# Patient Record
Sex: FEMALE | Race: WHITE | Employment: STUDENT | URBAN - METROPOLITAN AREA
[De-identification: names, ages, dates, MRNs, and addresses within clinical notes are randomized per-mention and may not be internally consistent; named-entity substitution may affect disease eponyms.]

---
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
## 2018-01-18 ENCOUNTER — OFFICE VISIT (OUTPATIENT)
Dept: OBGYN CLINIC | Facility: CLINIC | Age: 18
End: 2018-01-18

## 2018-01-18 VITALS
BODY MASS INDEX: 22.26 KG/M2 | HEART RATE: 97 BPM | HEIGHT: 64.5 IN | DIASTOLIC BLOOD PRESSURE: 78 MMHG | SYSTOLIC BLOOD PRESSURE: 121 MMHG | WEIGHT: 132 LBS

## 2018-01-18 DIAGNOSIS — Z01.419 ENCOUNTER FOR GYNECOLOGICAL EXAMINATION WITHOUT ABNORMAL FINDING: Primary | ICD-10-CM

## 2018-01-18 DIAGNOSIS — Z30.41 ENCOUNTER FOR BIRTH CONTROL PILLS MAINTENANCE: ICD-10-CM

## 2018-01-18 PROCEDURE — 99395 PREV VISIT EST AGE 18-39: CPT | Performed by: OBSTETRICS & GYNECOLOGY

## 2018-01-18 RX ORDER — ACETAMINOPHEN AND CODEINE PHOSPHATE 120; 12 MG/5ML; MG/5ML
0.35 SOLUTION ORAL DAILY
Qty: 3 PACKAGE | Refills: 4 | Status: SHIPPED | OUTPATIENT
Start: 2018-01-18 | End: 2019-07-08

## 2018-01-18 NOTE — PROGRESS NOTES
Well Woman Exam    HPI:  The patient is a 15yo female who presented for annual exam. She has no complaints. Got back from DR for New Years. She has lost 35 pounds and is very happy. 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  Getting menses about every 3 months wi history. MEDICATIONS:    Current Outpatient Prescriptions:   •  Norethindrone (ORTHO MICRONOR) 0.35 MG Oral Tab, Take 1 tablet (0.35 mg total) by mouth daily. , Disp: 3 Package, Rfl: 4  •  Acyclovir 5 % External Cream, Apply 1 Application topically every nipple retraction or skin changes  Abdomen:  soft, nontender, nondistended, no masses  Skin/Hair: no unusual rashes or bruises  Extremities: no edema, no cyanosis  Psychiatric: Appropriate mood and affect    Pelvic Exam:  External Genitalia: normal appeara

## 2018-01-19 ENCOUNTER — TELEPHONE (OUTPATIENT)
Dept: ALLERGY | Facility: CLINIC | Age: 18
End: 2018-01-19

## 2018-01-19 NOTE — TELEPHONE ENCOUNTER
Pt calling regarding medication Levocetirizine Dihydrochloride 5 MG Oral Tab. Pt state that her insurance does not cover this medication and would like to know if there is something similar that would be covered with her insurance. Please advise.   Pt was

## 2018-03-15 ENCOUNTER — OFFICE VISIT (OUTPATIENT)
Dept: SURGERY | Facility: CLINIC | Age: 18
End: 2018-03-15

## 2018-03-15 VITALS
OXYGEN SATURATION: 100 % | BODY MASS INDEX: 21.92 KG/M2 | WEIGHT: 131.56 LBS | SYSTOLIC BLOOD PRESSURE: 155 MMHG | HEIGHT: 65 IN | RESPIRATION RATE: 16 BRPM | HEART RATE: 119 BPM | DIASTOLIC BLOOD PRESSURE: 92 MMHG

## 2018-03-15 DIAGNOSIS — R63.2 BINGE EATING: Primary | ICD-10-CM

## 2018-03-15 DIAGNOSIS — Z51.81 ENCOUNTER FOR THERAPEUTIC DRUG MONITORING: ICD-10-CM

## 2018-03-15 DIAGNOSIS — R53.82 CHRONIC FATIGUE: ICD-10-CM

## 2018-03-15 DIAGNOSIS — E55.9 VITAMIN D DEFICIENCY: ICD-10-CM

## 2018-03-15 PROCEDURE — 99214 OFFICE O/P EST MOD 30 MIN: CPT | Performed by: INTERNAL MEDICINE

## 2018-05-07 DIAGNOSIS — B00.1 COLD SORE: Primary | ICD-10-CM

## 2018-05-07 RX ORDER — ACYCLOVIR 400 MG/1
400 TABLET ORAL
Qty: 32 TABLET | Refills: 0 | Status: SHIPPED | OUTPATIENT
Start: 2018-05-07 | End: 2018-05-14

## 2018-06-13 ENCOUNTER — OFFICE VISIT (OUTPATIENT)
Dept: SURGERY | Facility: CLINIC | Age: 18
End: 2018-06-13

## 2018-06-13 VITALS
BODY MASS INDEX: 22.06 KG/M2 | HEIGHT: 65 IN | WEIGHT: 132.44 LBS | HEART RATE: 120 BPM | DIASTOLIC BLOOD PRESSURE: 86 MMHG | SYSTOLIC BLOOD PRESSURE: 123 MMHG

## 2018-06-13 DIAGNOSIS — E55.9 VITAMIN D DEFICIENCY: ICD-10-CM

## 2018-06-13 DIAGNOSIS — Z51.81 ENCOUNTER FOR THERAPEUTIC DRUG MONITORING: ICD-10-CM

## 2018-06-13 DIAGNOSIS — R53.82 CHRONIC FATIGUE: Primary | ICD-10-CM

## 2018-06-13 DIAGNOSIS — R63.2 BINGE EATING: ICD-10-CM

## 2018-06-13 PROCEDURE — 99214 OFFICE O/P EST MOD 30 MIN: CPT | Performed by: INTERNAL MEDICINE

## 2018-06-13 NOTE — PROGRESS NOTES
[address], [facility]  [address] [facility] 91 [facility] [address]  Dept: [phone]     Date:   4/3/2017    Patient:  Al Jimenez  :      3/19/2000  MRN:      [id number]    Chief Complaint: (VYVANSE) 30 MG Oral Cap Take 1 capsule (30 mg total) by mouth every morning.  Disp: 30 capsule Rfl: 0   CLINDAMYCIN PHOSPHATE 1 % External Lotion APPLY 2 TIMES EVERY DAY A THIN FILM TO THE AFFECTED AREA(S) Disp: 60 mL Rfl: 5   clotrimazole-betamethasone (L meals in advance, Read nutrition labels, Drink 64 oz of water per day, Maintain a daily food journal, No drinking 30 minutes before or after meals, Utlize portion control strategies to reduce calorie intake, Identify triggers for eating and manage cues and per day. Feels much better with vyvanse  Tolerating well    Started on mini pill by GYN    Blood work done  SilverPush OTC b12    No orders of the defined types were placed in this encounter.         Kranthi Moreno MD

## 2018-07-12 ENCOUNTER — OFFICE VISIT (OUTPATIENT)
Dept: FAMILY MEDICINE CLINIC | Facility: CLINIC | Age: 18
End: 2018-07-12

## 2018-07-12 VITALS
TEMPERATURE: 99 F | SYSTOLIC BLOOD PRESSURE: 118 MMHG | HEIGHT: 65 IN | DIASTOLIC BLOOD PRESSURE: 80 MMHG | BODY MASS INDEX: 22.16 KG/M2 | HEART RATE: 90 BPM | OXYGEN SATURATION: 98 % | WEIGHT: 133 LBS

## 2018-07-12 DIAGNOSIS — J06.9 VIRAL URI: Primary | ICD-10-CM

## 2018-07-12 PROCEDURE — 99213 OFFICE O/P EST LOW 20 MIN: CPT | Performed by: NURSE PRACTITIONER

## 2018-07-12 RX ORDER — ACYCLOVIR 400 MG/1
TABLET ORAL AS NEEDED
COMMUNITY
Start: 2018-05-07 | End: 2020-03-10

## 2018-07-12 NOTE — PATIENT INSTRUCTIONS
· Hydrate! (cold or hot based on comfort). Drink lots of water or other non dehydrating liquids to help with illness. Salty foods, soups and tea can help with throat pain.    · Hand washing-use hand  or wash hands frequently, cover your cough Rinses help keep your sinuses and nose moist. Mix a teaspoon of salt in 8 ounces of fresh, warm water. Use a bulb syringe to gently squirt the water into your nose a few times a day. You can also buy ready-made saline nasal sprays.   Apply hot or cold packs · If symptoms are severe, rest at home for the first 2 to 3 days. When you resume activity, don't let yourself get too tired. · Avoid being exposed to cigarette smoke (yours or others’).   · You may use acetaminophen or ibuprofen to control pain and fever, © 7476-3042 [employer] [address]. [facility] [facility] [place]. All rights reserved. This information is not intended as a substitute for professional medical care. Always follow your healthcare professional's instructions.

## 2018-07-12 NOTE — PROGRESS NOTES
CHIEF COMPLAINT:   Patient presents with:  Sinus Problem      HPI:   Eward Ahumada is a 19year old female who presents for uri symptoms for  5 days.  Patient reports sore throat only at the beginning of sx's, congestion, ears poppiing, off balance wit Smoking status: Never Smoker                                                              Smokeless tobacco: Never Used                      Comment: No household smokers.    Alcohol use: Yes           0.0 oz/week     Comment: social        REVIEW OF SYSTEM The patient indicates understanding of these issues and agrees to the plan. OTC decongestants, throat lozenges and Tylenol     Patient Instructions           · Hydrate! (cold or hot based on comfort).  Drink lots of water or other non dehydrating liquids Rinses help keep your sinuses and nose moist. Mix a teaspoon of salt in 8 ounces of fresh, warm water. Use a bulb syringe to gently squirt the water into your nose a few times a day. You can also buy ready-made saline nasal sprays.   Apply hot or cold packs · If symptoms are severe, rest at home for the first 2 to 3 days. When you resume activity, don't let yourself get too tired. · Avoid being exposed to cigarette smoke (yours or others’).   · You may use acetaminophen or ibuprofen to control pain and fever, © 4126-2239 [employer]. 1407 [facility], [facility] [place]. All rights reserved. This information is not intended as a substitute for professional medical care. Always follow your healthcare professional's instructions.

## 2018-07-20 NOTE — TELEPHONE ENCOUNTER
Done.Lisset.
MRI order has been placed. Called patient. LMTCB. When patient calls back will provide number for central scheduling.
Order not yet placed. Let patient know AM out of office today but message has been routed and will call patient once order placed.
PRL levels are only mildy elevated now. Were around 39, now 25. Normal is 24.2    We can do two things: We can get a MRI tyo look for a pituitary tumor OR we can wait a month and repeat levels. Please let me know what she should like to do.     Atfer this
Pt called back and stated that she would like to have MRI completed.  Please call thank you
Pt called regarding MRI.  Please call thank you
Pt retd call. Pt aware MRI order entered. Number given for Central Scheduling.
See below patient would like order for MRI of pituitary.
Spoke with [employer]. Informed her of Dr. Graeme Salazar message below. She would like to discuss the options with her parents and will call back. Will await patient call.
Skin normal color for race, warm, dry and intact. No evidence of rash.

## 2018-08-29 ENCOUNTER — OFFICE VISIT (OUTPATIENT)
Dept: SURGERY | Facility: CLINIC | Age: 18
End: 2018-08-29
Payer: COMMERCIAL

## 2018-08-29 VITALS
HEART RATE: 103 BPM | HEIGHT: 65 IN | BODY MASS INDEX: 21.67 KG/M2 | OXYGEN SATURATION: 100 % | WEIGHT: 130.06 LBS | RESPIRATION RATE: 18 BRPM | DIASTOLIC BLOOD PRESSURE: 80 MMHG | SYSTOLIC BLOOD PRESSURE: 129 MMHG

## 2018-08-29 DIAGNOSIS — E55.9 VITAMIN D DEFICIENCY: ICD-10-CM

## 2018-08-29 DIAGNOSIS — Z51.81 ENCOUNTER FOR THERAPEUTIC DRUG MONITORING: ICD-10-CM

## 2018-08-29 DIAGNOSIS — R63.2 BINGE EATING: Primary | ICD-10-CM

## 2018-08-29 PROCEDURE — 99214 OFFICE O/P EST MOD 30 MIN: CPT | Performed by: INTERNAL MEDICINE

## 2018-08-29 NOTE — PROGRESS NOTES
[address], [address] [facility] [address]  Dept: [phone]     Date:   4/3/2017    Patient:  Queenie Marshall  :      3/19/2000  MRN:      [id number]    Chief Complaint: Social History:      Social History  Social History   Marital status: Single  Spouse name: N/A    Years of education: N/A  Number of children: N/A     Occupational History  None on file     Social History Main Topics   Smoking status: Never Smoker    S Minutes    ROS:    Constitutional: negative  Respiratory: negative  Cardiovascular: negative  Gastrointestinal: negative  Musculoskeletal:negative  Neurological: negative  Behavioral/Psych: negative  All other systems were reviewed and are negative    Phys

## 2018-09-10 NOTE — TELEPHONE ENCOUNTER
LOV: 10-14-16. Pt requesting refill on tretinoin 0.05% External Cream. Advised pt she will need to schedule an f/u appt in order to receive a refill pt went ahead and schedule an f/u appt for 10-19-18. Please advise.

## 2018-10-15 ENCOUNTER — TELEPHONE (OUTPATIENT)
Dept: OBGYN CLINIC | Facility: CLINIC | Age: 18
End: 2018-10-15

## 2018-10-15 NOTE — TELEPHONE ENCOUNTER
Informed pt that [place] [facility] [address] stated if she missed pills, she can have BTB. Continue with ocp, use condoms this month, and monitor. Informed pt to monitor and to call us if she continues with btb. Pt stated understanding.

## 2018-10-15 NOTE — TELEPHONE ENCOUNTER
Pt is calling, because she got a period 9/24/18 and it lasted 7 days and then another period 10/8 and it still continues. She states with the period of  10/8 it comes and goes, it is intermittent. When she is bleeding she changes a pad twice a day.   She h

## 2018-11-07 ENCOUNTER — OFFICE VISIT (OUTPATIENT)
Dept: SURGERY | Facility: CLINIC | Age: 18
End: 2018-11-07
Payer: COMMERCIAL

## 2018-11-07 VITALS
HEART RATE: 80 BPM | DIASTOLIC BLOOD PRESSURE: 66 MMHG | SYSTOLIC BLOOD PRESSURE: 124 MMHG | WEIGHT: 130 LBS | HEIGHT: 65 IN | RESPIRATION RATE: 20 BRPM | OXYGEN SATURATION: 99 % | BODY MASS INDEX: 21.66 KG/M2

## 2018-11-07 DIAGNOSIS — R53.82 CHRONIC FATIGUE: Primary | ICD-10-CM

## 2018-11-07 DIAGNOSIS — R63.2 BINGE EATING: ICD-10-CM

## 2018-11-07 DIAGNOSIS — E53.8 LOW SERUM VITAMIN B12: ICD-10-CM

## 2018-11-07 DIAGNOSIS — E66.3 OVERWEIGHT (BMI 25.0-29.9): ICD-10-CM

## 2018-11-07 DIAGNOSIS — E55.9 VITAMIN D DEFICIENCY: ICD-10-CM

## 2018-11-07 DIAGNOSIS — Z51.81 ENCOUNTER FOR THERAPEUTIC DRUG MONITORING: ICD-10-CM

## 2018-11-07 PROCEDURE — 99214 OFFICE O/P EST MOD 30 MIN: CPT | Performed by: INTERNAL MEDICINE

## 2018-11-07 NOTE — PROGRESS NOTES
[address], [address] [facility] [address]  Dept: [phone]     Date:   4/3/2017    Patient:  Abel Quiroga  :      3/19/2000  MRN:      [id number]    Chief Complaint: Social History:    Social History    Socioeconomic History      Marital status: Single      Spouse name: Not on file      Number of children: Not on file      Years of education: Not on file      Highest education level: Not on file    Social Needs of snacks per day: 1 Type of snacks:  fruit  · Amount of soda consumption per day:  0  · Amount of water (in ounces) per day:  64  · Drinking between meals only:  yes  · Toughest challenge:      Nutritional Goals  Limit carbohydrates to 100 gms per day, Ea Panel (14)          Standing Status: Future          Standing Expiration Date: 11/7/2019      Vitamin B12          Standing Status: Future          Standing Expiration Date: 11/7/2019      Vitamin D, 25-Hydroxy          Standing Status: Future          Sta

## 2018-12-07 ENCOUNTER — LAB ENCOUNTER (OUTPATIENT)
Dept: LAB | Facility: HOSPITAL | Age: 18
End: 2018-12-07
Attending: INTERNAL MEDICINE
Payer: COMMERCIAL

## 2018-12-07 DIAGNOSIS — Z51.81 ENCOUNTER FOR THERAPEUTIC DRUG MONITORING: ICD-10-CM

## 2018-12-07 DIAGNOSIS — E53.8 LOW SERUM VITAMIN B12: ICD-10-CM

## 2018-12-07 DIAGNOSIS — R63.2 BINGE EATING: ICD-10-CM

## 2018-12-07 DIAGNOSIS — E55.9 VITAMIN D DEFICIENCY: ICD-10-CM

## 2018-12-07 DIAGNOSIS — E66.3 OVERWEIGHT (BMI 25.0-29.9): ICD-10-CM

## 2018-12-07 DIAGNOSIS — R53.82 CHRONIC FATIGUE: ICD-10-CM

## 2018-12-07 PROCEDURE — 82607 VITAMIN B-12: CPT

## 2018-12-07 PROCEDURE — 84443 ASSAY THYROID STIM HORMONE: CPT

## 2018-12-07 PROCEDURE — 80053 COMPREHEN METABOLIC PANEL: CPT

## 2018-12-07 PROCEDURE — 82306 VITAMIN D 25 HYDROXY: CPT

## 2018-12-07 PROCEDURE — 80061 LIPID PANEL: CPT

## 2018-12-07 PROCEDURE — 85025 COMPLETE CBC W/AUTO DIFF WBC: CPT

## 2018-12-07 PROCEDURE — 36415 COLL VENOUS BLD VENIPUNCTURE: CPT

## 2018-12-28 ENCOUNTER — TELEPHONE (OUTPATIENT)
Dept: INTERNAL MEDICINE CLINIC | Facility: CLINIC | Age: 18
End: 2018-12-28

## 2018-12-28 NOTE — TELEPHONE ENCOUNTER
Was in [facility]. Came home, now has diarrhea. Do you want to do a fecal test? (mom a nurse and can bring home a collection cup). Also has cramps. Since last Thursday.   At least once or twice a day; especially during a meal has cramping and

## 2019-01-28 ENCOUNTER — OFFICE VISIT (OUTPATIENT)
Dept: SURGERY | Facility: CLINIC | Age: 19
End: 2019-01-28
Payer: COMMERCIAL

## 2019-01-28 VITALS
DIASTOLIC BLOOD PRESSURE: 87 MMHG | WEIGHT: 127.06 LBS | SYSTOLIC BLOOD PRESSURE: 142 MMHG | RESPIRATION RATE: 18 BRPM | BODY MASS INDEX: 21.17 KG/M2 | HEART RATE: 120 BPM | OXYGEN SATURATION: 100 % | HEIGHT: 65 IN

## 2019-01-28 DIAGNOSIS — R63.2 BINGE EATING: Primary | ICD-10-CM

## 2019-01-28 DIAGNOSIS — Z51.81 ENCOUNTER FOR THERAPEUTIC DRUG MONITORING: ICD-10-CM

## 2019-01-28 DIAGNOSIS — R63.2 INCREASED APPETITE: ICD-10-CM

## 2019-01-28 DIAGNOSIS — R53.82 CHRONIC FATIGUE: ICD-10-CM

## 2019-01-28 PROCEDURE — 99214 OFFICE O/P EST MOD 30 MIN: CPT | Performed by: INTERNAL MEDICINE

## 2019-01-28 NOTE — PROGRESS NOTES
[address], [facility]  [address] [facility] 91 [facility] [address]  Dept: [phone]     Date:   4/3/2017    Patient:  Tacos Genao  :      3/19/2000  MRN:      [id number]    Chief Complaint: Mold     Social History:    Social History    Socioeconomic History      Marital status: Single      Spouse name: Not on file      Number of children: Not on file      Years of education: Not on file      Highest education level: Not on file    Social Need 20  · # of snacks per day: 1 Type of snacks:  fruit  · Amount of soda consumption per day:  0  · Amount of water (in ounces) per day:  64  · Drinking between meals only:  yes  · Toughest challenge:      Nutritional Goals  Limit carbohydrates to 100 gms per bariatric surgery. Patient desires to pursue traditional weight loss at this time. Fatigue: improving with weight loss      Binge eating: improved with vyvanse  Increased stress with school    Goals for next month:  1. Keep a food log.   2. Drink 48-64

## 2019-02-14 ENCOUNTER — OFFICE VISIT (OUTPATIENT)
Dept: ALLERGY | Facility: CLINIC | Age: 19
End: 2019-02-14
Payer: COMMERCIAL

## 2019-02-14 VITALS
HEART RATE: 78 BPM | SYSTOLIC BLOOD PRESSURE: 112 MMHG | TEMPERATURE: 98 F | RESPIRATION RATE: 17 BRPM | OXYGEN SATURATION: 93 % | DIASTOLIC BLOOD PRESSURE: 60 MMHG

## 2019-02-14 DIAGNOSIS — J30.89 SEASONAL ALLERGIC RHINITIS DUE TO FUNGAL SPORES: Primary | ICD-10-CM

## 2019-02-14 DIAGNOSIS — J30.81 ALLERGIC RHINITIS DUE TO ANIMALS: ICD-10-CM

## 2019-02-14 PROCEDURE — 99212 OFFICE O/P EST SF 10 MIN: CPT | Performed by: ALLERGY & IMMUNOLOGY

## 2019-02-14 PROCEDURE — 99214 OFFICE O/P EST MOD 30 MIN: CPT | Performed by: ALLERGY & IMMUNOLOGY

## 2019-02-14 NOTE — PROGRESS NOTES
Josie Keene is a 19year old female. HPI:   Patient presents with: Allergies: Pt reports she needs to have a letterhead from Dr. Lashaun Delgado stating she needs a dorm with Air Conditioning due to her seasonal allergies.  Pt reports she has tried to live i Smoker      Smokeless tobacco: Never Used      Tobacco comment: No household smokers. Alcohol use:  Yes      Alcohol/week: 0.0 oz      Comment: social    Drug use: No       Medications (Active prior to today's visit):    Current Outpatient Medications: are moist   Neck/Thyroid: neck is supple without adenopathy  Lymphatic: no abnormal cervical, supraclavicular or axillary adenopathy is noted  Respiratory: normal to inspection lungs are clear to auscultation bilaterally normal respiratory effort   Cardiov

## 2019-02-14 NOTE — PATIENT INSTRUCTIONS
Reviewed prior skin testing in the past being positive to mold cats dogs and cockroaches over 2 years ago  Reviewed avoidance measures as well as potential treatment option of immunotherapy  Continue with Xyzal or Allegra as needed  Add Flonase or Rhinocor

## 2019-02-18 ENCOUNTER — OFFICE VISIT (OUTPATIENT)
Dept: INTERNAL MEDICINE CLINIC | Facility: CLINIC | Age: 19
End: 2019-02-18
Payer: COMMERCIAL

## 2019-02-18 VITALS
DIASTOLIC BLOOD PRESSURE: 70 MMHG | WEIGHT: 129 LBS | HEART RATE: 101 BPM | BODY MASS INDEX: 21.49 KG/M2 | OXYGEN SATURATION: 100 % | SYSTOLIC BLOOD PRESSURE: 104 MMHG | HEIGHT: 65 IN

## 2019-02-18 DIAGNOSIS — B36.0 TINEA VERSICOLOR: ICD-10-CM

## 2019-02-18 DIAGNOSIS — Z02.0 SCHOOL PHYSICAL EXAM: Primary | ICD-10-CM

## 2019-02-18 PROCEDURE — 99213 OFFICE O/P EST LOW 20 MIN: CPT | Performed by: INTERNAL MEDICINE

## 2019-02-18 RX ORDER — SELENIUM SULFIDE 2.5 MG/100ML
LOTION TOPICAL
Qty: 150 ML | Refills: 1 | Status: SHIPPED | OUTPATIENT
Start: 2019-02-18 | End: 2021-08-27

## 2019-02-19 PROCEDURE — 86580 TB INTRADERMAL TEST: CPT | Performed by: INTERNAL MEDICINE

## 2019-02-19 NOTE — PROGRESS NOTES
HPI:    Patient ID: Meghna Guerrero is a 19year old female. 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 Has some depigmented areas on lateral thighs.     Review of Systems   Constitutional: Negative for fatigue, and ear canal normal.   Nose: Nose normal.   Eyes: Pupils are equal, round, and reactive to light. No scleral icterus. Neck: Normal range of motion. Neck supple. Cardiovascular: Normal rate and regular rhythm.     Pulmonary/Chest: Effort normal and hanane

## 2019-02-20 ENCOUNTER — NURSE ONLY (OUTPATIENT)
Dept: INTERNAL MEDICINE CLINIC | Facility: CLINIC | Age: 19
End: 2019-02-20
Payer: COMMERCIAL

## 2019-02-20 LAB — INDURATION (): 0 MM (ref 0–11)

## 2019-03-07 ENCOUNTER — OFFICE VISIT (OUTPATIENT)
Dept: DERMATOLOGY CLINIC | Facility: CLINIC | Age: 19
End: 2019-03-07
Payer: COMMERCIAL

## 2019-03-07 DIAGNOSIS — L30.9 DERMATITIS: ICD-10-CM

## 2019-03-07 DIAGNOSIS — L70.0 ACNE VULGARIS: ICD-10-CM

## 2019-03-07 DIAGNOSIS — L73.9 FOLLICULITIS: Primary | ICD-10-CM

## 2019-03-07 PROCEDURE — 99212 OFFICE O/P EST SF 10 MIN: CPT | Performed by: DERMATOLOGY

## 2019-03-07 PROCEDURE — 99213 OFFICE O/P EST LOW 20 MIN: CPT | Performed by: DERMATOLOGY

## 2019-04-24 ENCOUNTER — OFFICE VISIT (OUTPATIENT)
Dept: SURGERY | Facility: CLINIC | Age: 19
End: 2019-04-24
Payer: COMMERCIAL

## 2019-04-24 VITALS
SYSTOLIC BLOOD PRESSURE: 118 MMHG | HEART RATE: 78 BPM | BODY MASS INDEX: 21.49 KG/M2 | OXYGEN SATURATION: 99 % | RESPIRATION RATE: 16 BRPM | HEIGHT: 65 IN | WEIGHT: 129 LBS | DIASTOLIC BLOOD PRESSURE: 78 MMHG

## 2019-04-24 DIAGNOSIS — R63.2 BINGE EATING: Primary | ICD-10-CM

## 2019-04-24 DIAGNOSIS — R63.2 INCREASED APPETITE: ICD-10-CM

## 2019-04-24 DIAGNOSIS — E55.9 VITAMIN D DEFICIENCY: ICD-10-CM

## 2019-04-24 DIAGNOSIS — Z51.81 ENCOUNTER FOR THERAPEUTIC DRUG MONITORING: ICD-10-CM

## 2019-04-24 PROCEDURE — 99214 OFFICE O/P EST MOD 30 MIN: CPT | Performed by: INTERNAL MEDICINE

## 2019-04-24 RX ORDER — LISDEXAMFETAMINE DIMESYLATE 30 MG/1
CAPSULE ORAL
COMMUNITY
Start: 2019-04-18 | End: 2020-02-10

## 2019-04-24 NOTE — PROGRESS NOTES
[address] 91 [facility] [address]  Dept: [phone]     Date:   4/3/2017    Patient:  Tamika Dye  :      3/19/2000  MRN:      [id number]    Chief Complaint: Topically as directed Disp: 150 mL Rfl: 1   Tretinoin 0.05 % External Cream Apply to acne  as directed at bedtime.  Disp: 20 g Rfl: 0   acyclovir 400 MG Oral Tab  Disp:  Rfl:    Norethindrone (ORTHO MICRONOR) 0.35 MG Oral Tab Take 1 tablet (0.35 mg total) b Not on file    Other Topics      Concerns:         Service: Not Asked        Blood Transfusions: Not Asked        Caffeine Concern: Yes          2 cups daily coffee or tea        Occupational Exposure: Not Asked        Hobby Hazards: Not Asked Treadmill for  45 Minutes    ROS:    Constitutional: negative  Respiratory: negative  Cardiovascular: negative  Gastrointestinal: negative  Musculoskeletal:negative  Neurological: negative  Behavioral/Psych: negative  All other systems were reviewed and ar

## 2019-04-29 ENCOUNTER — APPOINTMENT (OUTPATIENT)
Dept: LAB | Facility: HOSPITAL | Age: 19
End: 2019-04-29
Attending: INTERNAL MEDICINE
Payer: COMMERCIAL

## 2019-04-29 DIAGNOSIS — R63.2 BINGE EATING: ICD-10-CM

## 2019-04-29 DIAGNOSIS — R63.2 INCREASED APPETITE: ICD-10-CM

## 2019-04-29 DIAGNOSIS — Z51.81 ENCOUNTER FOR THERAPEUTIC DRUG MONITORING: ICD-10-CM

## 2019-04-29 PROCEDURE — 93010 ELECTROCARDIOGRAM REPORT: CPT | Performed by: INTERNAL MEDICINE

## 2019-04-29 PROCEDURE — 93005 ELECTROCARDIOGRAM TRACING: CPT

## 2019-06-18 ENCOUNTER — OFFICE VISIT (OUTPATIENT)
Dept: DERMATOLOGY CLINIC | Facility: CLINIC | Age: 19
End: 2019-06-18
Payer: COMMERCIAL

## 2019-06-18 DIAGNOSIS — L72.0 EPIDERMAL CYST: ICD-10-CM

## 2019-06-18 DIAGNOSIS — R23.1 LIVEDO RETICULARIS: Primary | ICD-10-CM

## 2019-06-18 PROCEDURE — 99212 OFFICE O/P EST SF 10 MIN: CPT | Performed by: DERMATOLOGY

## 2019-06-18 PROCEDURE — 99213 OFFICE O/P EST LOW 20 MIN: CPT | Performed by: DERMATOLOGY

## 2019-06-18 NOTE — PROGRESS NOTES
Past Medical History:   Diagnosis Date   • Acute bronchitis    • Allergic rhinitis    • Closed fracture of unspecified phalanx or phalanges of hand 2010   • Facial erythema 2001   • Other acne 2014   • Overweight (BMI 25.0-29. 9)    • Seasonal allergies Concerns:         Service: Not Asked        Blood Transfusions: Not Asked        Caffeine Concern: Yes          2 cups daily coffee or tea        Occupational Exposure: Not Asked        Hobby Hazards: Not Asked        Sleep Concern: Not Asked

## 2019-06-18 NOTE — PATIENT INSTRUCTIONS
The changes on your legs are most likely due to vasospasm/a physiologic form of livedo reticularis.   No specific treatment is recommended at this time, but if you do develop arthritis or muscle weakness or muscle aches or joint aches and pains or any issue

## 2019-06-18 NOTE — PROGRESS NOTES
HPI:     Chief Complaint     Derm Problem; Lesion        HPI     Derm Problem      Additional comments: LOV 3/7/2019 Patient present with discoloration on bilateral legs and abdomen .  Patient denies any family or personal hx of sc              Lesion MG Oral Cap Take 1 capsule (30 mg total) by mouth daily. Disp: 30 capsule Rfl: 0   triamcinolone acetonide 0.1 % External Cream Use bid as directed Disp: 80 g Rfl: 0   Azelaic Acid (FINACEA) 15 % External Foam Apply 1 Application topically daily.  Disp: 50 use: Yes        Alcohol/week: 0.0 oz        Comment: social      Drug use: No      Sexual activity: Yes        Birth control/protection: Condom    Lifestyle      Physical activity:        Days per week: Not on file        Minutes per session: Not on file midline. 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 In light of absence of symptoms, I do not think patient needs a connective tissue or other evaluation at this time.   Has had recent normal CBC and

## 2019-07-08 RX ORDER — NORETHINDRONE 0.35 MG/1
TABLET ORAL
Qty: 84 TABLET | Refills: 0 | OUTPATIENT
Start: 2019-07-08

## 2019-07-08 RX ORDER — ACETAMINOPHEN AND CODEINE PHOSPHATE 120; 12 MG/5ML; MG/5ML
0.35 SOLUTION ORAL DAILY
Qty: 1 PACKAGE | Refills: 0 | Status: SHIPPED | OUTPATIENT
Start: 2019-07-08 | End: 2019-08-05

## 2019-07-08 RX ORDER — ACETAMINOPHEN AND CODEINE PHOSPHATE 120; 12 MG/5ML; MG/5ML
0.35 SOLUTION ORAL DAILY
Qty: 3 PACKAGE | Refills: 4 | OUTPATIENT
Start: 2019-07-08

## 2019-07-08 NOTE — TELEPHONE ENCOUNTER
1 month OCP sent to Pharmacy per protocol. LM informing pt Rx sent to Pharmacy.    Last annual=1/18/18  Next annual= 7/30/19

## 2019-07-09 NOTE — TELEPHONE ENCOUNTER
Spoke with patient and informed her per Duc prescription will be at the  at the [place] location ready to be picked up. Patient verbalizes understanding.

## 2019-07-30 ENCOUNTER — OFFICE VISIT (OUTPATIENT)
Dept: SURGERY | Facility: CLINIC | Age: 19
End: 2019-07-30
Payer: COMMERCIAL

## 2019-07-30 ENCOUNTER — OFFICE VISIT (OUTPATIENT)
Dept: OBGYN CLINIC | Facility: CLINIC | Age: 19
End: 2019-07-30
Payer: COMMERCIAL

## 2019-07-30 VITALS
WEIGHT: 131.88 LBS | SYSTOLIC BLOOD PRESSURE: 123 MMHG | HEART RATE: 121 BPM | DIASTOLIC BLOOD PRESSURE: 83 MMHG | OXYGEN SATURATION: 100 % | HEIGHT: 65 IN | BODY MASS INDEX: 21.97 KG/M2

## 2019-07-30 VITALS
WEIGHT: 131 LBS | SYSTOLIC BLOOD PRESSURE: 122 MMHG | HEIGHT: 65 IN | HEART RATE: 103 BPM | DIASTOLIC BLOOD PRESSURE: 82 MMHG | BODY MASS INDEX: 21.83 KG/M2

## 2019-07-30 DIAGNOSIS — Z30.41 ENCOUNTER FOR BIRTH CONTROL PILLS MAINTENANCE: ICD-10-CM

## 2019-07-30 DIAGNOSIS — Z01.419 ENCOUNTER FOR GYNECOLOGICAL EXAMINATION WITHOUT ABNORMAL FINDING: Primary | ICD-10-CM

## 2019-07-30 DIAGNOSIS — Z51.81 ENCOUNTER FOR THERAPEUTIC DRUG MONITORING: Primary | ICD-10-CM

## 2019-07-30 DIAGNOSIS — Z12.4 CERVICAL CANCER SCREENING: ICD-10-CM

## 2019-07-30 DIAGNOSIS — R53.82 CHRONIC FATIGUE: ICD-10-CM

## 2019-07-30 DIAGNOSIS — R63.2 INCREASED APPETITE: ICD-10-CM

## 2019-07-30 DIAGNOSIS — R63.2 BINGE EATING: ICD-10-CM

## 2019-07-30 PROCEDURE — 99214 OFFICE O/P EST MOD 30 MIN: CPT | Performed by: INTERNAL MEDICINE

## 2019-07-30 PROCEDURE — 99395 PREV VISIT EST AGE 18-39: CPT | Performed by: OBSTETRICS & GYNECOLOGY

## 2019-07-30 RX ORDER — ACETAMINOPHEN AND CODEINE PHOSPHATE 120; 12 MG/5ML; MG/5ML
0.35 SOLUTION ORAL DAILY
Qty: 3 PACKAGE | Refills: 4 | Status: SHIPPED | OUTPATIENT
Start: 2019-07-30 | End: 2020-11-06

## 2019-07-30 NOTE — PROGRESS NOTES
[address] [facility] [address]  Dept: [phone]     Date:   4/3/2017    Patient:  Donovan Dubin  :      3/19/2000  MRN:      [id number]    Chief Complaint: Topically as directed Disp: 150 mL Rfl: 1   Levocetirizine Dihydrochloride 5 MG Oral Tab Take 1 tablet (5 mg total) by mouth nightly. Disp: 30 tablet Rfl: 3     Allergies:  Cats Claw, Uncaria Tomentosa; Dog Dander [Dander];  Mold     Social History:    Soci Not Asked        Sleep Concern: Not Asked        Stress Concern: Not Asked        Weight Concern: Not Asked        Special Diet: Not Asked        Back Care: Not Asked        Exercise: Yes          gym class        Bike Helmet: Not Asked        Seat Belt: N reviewed and are negative    Physical Exam:   General appearance: alert, appears stated age and cooperative  Head: Normocephalic, without obvious abnormality, atraumatic  Eyes: conjunctivae/corneas clear. PERRL, EOM's intact. Fundi benign.   Lungs: clear to

## 2019-09-13 ENCOUNTER — HOSPITAL ENCOUNTER (EMERGENCY)
Facility: HOSPITAL | Age: 19
Discharge: HOME | End: 2019-09-13
Attending: EMERGENCY MEDICINE
Payer: COMMERCIAL

## 2019-09-13 ENCOUNTER — ANESTHESIA EVENT (OUTPATIENT)
Dept: SURGERY | Facility: HOSPITAL | Age: 19
End: 2019-09-13
Payer: COMMERCIAL

## 2019-09-13 ENCOUNTER — ANESTHESIA (OUTPATIENT)
Dept: SURGERY | Facility: HOSPITAL | Age: 19
End: 2019-09-13
Payer: COMMERCIAL

## 2019-09-13 ENCOUNTER — APPOINTMENT (OUTPATIENT)
Dept: CT IMAGING | Facility: HOSPITAL | Age: 19
End: 2019-09-13
Attending: EMERGENCY MEDICINE
Payer: COMMERCIAL

## 2019-09-13 ENCOUNTER — APPOINTMENT (OUTPATIENT)
Dept: ULTRASOUND IMAGING | Facility: HOSPITAL | Age: 19
End: 2019-09-13
Attending: EMERGENCY MEDICINE
Payer: COMMERCIAL

## 2019-09-13 VITALS
TEMPERATURE: 98 F | RESPIRATION RATE: 16 BRPM | SYSTOLIC BLOOD PRESSURE: 114 MMHG | DIASTOLIC BLOOD PRESSURE: 71 MMHG | OXYGEN SATURATION: 100 % | HEIGHT: 65 IN | WEIGHT: 130 LBS | HEART RATE: 59 BPM | BODY MASS INDEX: 21.66 KG/M2

## 2019-09-13 DIAGNOSIS — K35.80 ACUTE APPENDICITIS, UNSPECIFIED ACUTE APPENDICITIS TYPE: Primary | ICD-10-CM

## 2019-09-13 DIAGNOSIS — K37 APPENDICITIS: ICD-10-CM

## 2019-09-13 LAB
ANION GAP SERPL CALC-SCNC: 6 MMOL/L (ref 0–18)
B-HCG UR QL: NEGATIVE
BACTERIA UR QL AUTO: NEGATIVE /HPF
BASOPHILS # BLD AUTO: 0.04 X10(3) UL (ref 0–0.2)
BASOPHILS NFR BLD AUTO: 0.3 %
BILIRUB UR QL: NEGATIVE
BUN BLD-MCNC: 14 MG/DL (ref 7–18)
BUN/CREAT SERPL: 18.2 (ref 10–20)
CALCIUM BLD-MCNC: 9.1 MG/DL (ref 8.5–10.1)
CHLORIDE SERPL-SCNC: 106 MMOL/L (ref 98–112)
CLARITY UR: CLEAR
CO2 SERPL-SCNC: 29 MMOL/L (ref 21–32)
COLOR UR: YELLOW
CREAT BLD-MCNC: 0.77 MG/DL (ref 0.55–1.02)
DEPRECATED RDW RBC AUTO: 39.8 FL (ref 35.1–46.3)
EOSINOPHIL # BLD AUTO: 0.04 X10(3) UL (ref 0–0.7)
EOSINOPHIL NFR BLD AUTO: 0.3 %
ERYTHROCYTE [DISTWIDTH] IN BLOOD BY AUTOMATED COUNT: 12.1 % (ref 11–15)
GLUCOSE BLD-MCNC: 115 MG/DL (ref 70–99)
GLUCOSE UR-MCNC: NEGATIVE MG/DL
HCT VFR BLD AUTO: 42.1 % (ref 35–48)
HGB BLD-MCNC: 14.2 G/DL (ref 12–16)
HGB UR QL STRIP.AUTO: NEGATIVE
IMM GRANULOCYTES # BLD AUTO: 0.03 X10(3) UL (ref 0–1)
IMM GRANULOCYTES NFR BLD: 0.2 %
KETONES UR-MCNC: NEGATIVE MG/DL
LYMPHOCYTES # BLD AUTO: 1.33 X10(3) UL (ref 1–4)
LYMPHOCYTES NFR BLD AUTO: 10.6 %
MCH RBC QN AUTO: 30.2 PG (ref 26–34)
MCHC RBC AUTO-ENTMCNC: 33.7 G/DL (ref 31–37)
MCV RBC AUTO: 89.6 FL (ref 80–100)
MONOCYTES # BLD AUTO: 0.64 X10(3) UL (ref 0.1–1)
MONOCYTES NFR BLD AUTO: 5.1 %
NEUTROPHILS # BLD AUTO: 10.42 X10 (3) UL (ref 1.5–7.7)
NEUTROPHILS # BLD AUTO: 10.42 X10(3) UL (ref 1.5–7.7)
NEUTROPHILS NFR BLD AUTO: 83.5 %
NITRITE UR QL STRIP.AUTO: NEGATIVE
OSMOLALITY SERPL CALC.SUM OF ELEC: 293 MOSM/KG (ref 275–295)
PH UR: 6 [PH] (ref 5–8)
PLATELET # BLD AUTO: 218 10(3)UL (ref 150–450)
POTASSIUM SERPL-SCNC: 4.2 MMOL/L (ref 3.5–5.1)
PROT UR-MCNC: 30 MG/DL
RBC # BLD AUTO: 4.7 X10(6)UL (ref 3.8–5.3)
RBC #/AREA URNS AUTO: 4 /HPF
SODIUM SERPL-SCNC: 141 MMOL/L (ref 136–145)
SP GR UR STRIP: 1.03 (ref 1–1.03)
UROBILINOGEN UR STRIP-ACNC: <2
VIT C UR-MCNC: 20 MG/DL
WBC # BLD AUTO: 12.5 X10(3) UL (ref 4–11)
WBC #/AREA URNS AUTO: 2 /HPF

## 2019-09-13 PROCEDURE — 76856 US EXAM PELVIC COMPLETE: CPT | Performed by: EMERGENCY MEDICINE

## 2019-09-13 PROCEDURE — 74177 CT ABD & PELVIS W/CONTRAST: CPT | Performed by: EMERGENCY MEDICINE

## 2019-09-13 PROCEDURE — 0DTJ4ZZ RESECTION OF APPENDIX, PERCUTANEOUS ENDOSCOPIC APPROACH: ICD-10-PCS | Performed by: SURGERY

## 2019-09-13 PROCEDURE — 76830 TRANSVAGINAL US NON-OB: CPT | Performed by: EMERGENCY MEDICINE

## 2019-09-13 PROCEDURE — 99244 OFF/OP CNSLTJ NEW/EST MOD 40: CPT | Performed by: SURGERY

## 2019-09-13 PROCEDURE — 93975 VASCULAR STUDY: CPT | Performed by: EMERGENCY MEDICINE

## 2019-09-13 PROCEDURE — 44970 LAPAROSCOPY APPENDECTOMY: CPT | Performed by: SURGERY

## 2019-09-13 RX ORDER — DEXAMETHASONE SODIUM PHOSPHATE 4 MG/ML
VIAL (ML) INJECTION AS NEEDED
Status: DISCONTINUED | OUTPATIENT
Start: 2019-09-13 | End: 2019-09-13 | Stop reason: SURG

## 2019-09-13 RX ORDER — ONDANSETRON 2 MG/ML
4 INJECTION INTRAMUSCULAR; INTRAVENOUS ONCE AS NEEDED
Status: COMPLETED | OUTPATIENT
Start: 2019-09-13 | End: 2019-09-13

## 2019-09-13 RX ORDER — HYDROMORPHONE HYDROCHLORIDE 1 MG/ML
0.6 INJECTION, SOLUTION INTRAMUSCULAR; INTRAVENOUS; SUBCUTANEOUS EVERY 5 MIN PRN
Status: DISCONTINUED | OUTPATIENT
Start: 2019-09-13 | End: 2019-09-13

## 2019-09-13 RX ORDER — HYDROCODONE BITARTRATE AND ACETAMINOPHEN 7.5; 325 MG/1; MG/1
1 TABLET ORAL EVERY 4 HOURS PRN
Status: DISCONTINUED | OUTPATIENT
Start: 2019-09-13 | End: 2019-09-13

## 2019-09-13 RX ORDER — SODIUM CHLORIDE, SODIUM LACTATE, POTASSIUM CHLORIDE, CALCIUM CHLORIDE 600; 310; 30; 20 MG/100ML; MG/100ML; MG/100ML; MG/100ML
INJECTION, SOLUTION INTRAVENOUS CONTINUOUS
Status: DISCONTINUED | OUTPATIENT
Start: 2019-09-13 | End: 2019-09-13

## 2019-09-13 RX ORDER — MORPHINE SULFATE 4 MG/ML
4 INJECTION, SOLUTION INTRAMUSCULAR; INTRAVENOUS EVERY 10 MIN PRN
Status: DISCONTINUED | OUTPATIENT
Start: 2019-09-13 | End: 2019-09-13

## 2019-09-13 RX ORDER — ONDANSETRON 2 MG/ML
INJECTION INTRAMUSCULAR; INTRAVENOUS AS NEEDED
Status: DISCONTINUED | OUTPATIENT
Start: 2019-09-13 | End: 2019-09-13 | Stop reason: SURG

## 2019-09-13 RX ORDER — MIDAZOLAM HYDROCHLORIDE 1 MG/ML
INJECTION INTRAMUSCULAR; INTRAVENOUS AS NEEDED
Status: DISCONTINUED | OUTPATIENT
Start: 2019-09-13 | End: 2019-09-13 | Stop reason: SURG

## 2019-09-13 RX ORDER — SODIUM CHLORIDE, SODIUM LACTATE, POTASSIUM CHLORIDE, CALCIUM CHLORIDE 600; 310; 30; 20 MG/100ML; MG/100ML; MG/100ML; MG/100ML
INJECTION, SOLUTION INTRAVENOUS CONTINUOUS PRN
Status: DISCONTINUED | OUTPATIENT
Start: 2019-09-13 | End: 2019-09-13 | Stop reason: SURG

## 2019-09-13 RX ORDER — LIDOCAINE HYDROCHLORIDE 10 MG/ML
INJECTION, SOLUTION EPIDURAL; INFILTRATION; INTRACAUDAL; PERINEURAL AS NEEDED
Status: DISCONTINUED | OUTPATIENT
Start: 2019-09-13 | End: 2019-09-13 | Stop reason: SURG

## 2019-09-13 RX ORDER — MORPHINE SULFATE 4 MG/ML
2 INJECTION, SOLUTION INTRAMUSCULAR; INTRAVENOUS EVERY 2 HOUR PRN
Status: DISCONTINUED | OUTPATIENT
Start: 2019-09-13 | End: 2019-09-13

## 2019-09-13 RX ORDER — MORPHINE SULFATE 4 MG/ML
8 INJECTION, SOLUTION INTRAMUSCULAR; INTRAVENOUS EVERY 2 HOUR PRN
Status: DISCONTINUED | OUTPATIENT
Start: 2019-09-13 | End: 2019-09-13

## 2019-09-13 RX ORDER — GLYCOPYRROLATE 0.2 MG/ML
INJECTION INTRAMUSCULAR; INTRAVENOUS AS NEEDED
Status: DISCONTINUED | OUTPATIENT
Start: 2019-09-13 | End: 2019-09-13 | Stop reason: SURG

## 2019-09-13 RX ORDER — HYDROCODONE BITARTRATE AND ACETAMINOPHEN 5; 325 MG/1; MG/1
2 TABLET ORAL AS NEEDED
Status: DISCONTINUED | OUTPATIENT
Start: 2019-09-13 | End: 2019-09-13

## 2019-09-13 RX ORDER — HYDROMORPHONE HYDROCHLORIDE 1 MG/ML
0.2 INJECTION, SOLUTION INTRAMUSCULAR; INTRAVENOUS; SUBCUTANEOUS EVERY 5 MIN PRN
Status: DISCONTINUED | OUTPATIENT
Start: 2019-09-13 | End: 2019-09-13

## 2019-09-13 RX ORDER — ONDANSETRON 2 MG/ML
4 INJECTION INTRAMUSCULAR; INTRAVENOUS EVERY 6 HOURS PRN
Status: DISCONTINUED | OUTPATIENT
Start: 2019-09-13 | End: 2019-09-13

## 2019-09-13 RX ORDER — ROCURONIUM BROMIDE 10 MG/ML
INJECTION, SOLUTION INTRAVENOUS AS NEEDED
Status: DISCONTINUED | OUTPATIENT
Start: 2019-09-13 | End: 2019-09-13 | Stop reason: SURG

## 2019-09-13 RX ORDER — BUPIVACAINE HYDROCHLORIDE AND EPINEPHRINE 2.5; 5 MG/ML; UG/ML
INJECTION, SOLUTION INFILTRATION; PERINEURAL AS NEEDED
Status: DISCONTINUED | OUTPATIENT
Start: 2019-09-13 | End: 2019-09-13 | Stop reason: HOSPADM

## 2019-09-13 RX ORDER — NALOXONE HYDROCHLORIDE 0.4 MG/ML
80 INJECTION, SOLUTION INTRAMUSCULAR; INTRAVENOUS; SUBCUTANEOUS AS NEEDED
Status: DISCONTINUED | OUTPATIENT
Start: 2019-09-13 | End: 2019-09-13

## 2019-09-13 RX ORDER — HYDROMORPHONE HYDROCHLORIDE 1 MG/ML
0.4 INJECTION, SOLUTION INTRAMUSCULAR; INTRAVENOUS; SUBCUTANEOUS EVERY 5 MIN PRN
Status: DISCONTINUED | OUTPATIENT
Start: 2019-09-13 | End: 2019-09-13

## 2019-09-13 RX ORDER — HYDROCODONE BITARTRATE AND ACETAMINOPHEN 7.5; 325 MG/1; MG/1
2 TABLET ORAL EVERY 4 HOURS PRN
Status: DISCONTINUED | OUTPATIENT
Start: 2019-09-13 | End: 2019-09-13

## 2019-09-13 RX ORDER — MORPHINE SULFATE 4 MG/ML
2 INJECTION, SOLUTION INTRAMUSCULAR; INTRAVENOUS EVERY 10 MIN PRN
Status: DISCONTINUED | OUTPATIENT
Start: 2019-09-13 | End: 2019-09-13

## 2019-09-13 RX ORDER — PROCHLORPERAZINE EDISYLATE 5 MG/ML
5 INJECTION INTRAMUSCULAR; INTRAVENOUS ONCE AS NEEDED
Status: COMPLETED | OUTPATIENT
Start: 2019-09-13 | End: 2019-09-13

## 2019-09-13 RX ORDER — SODIUM CHLORIDE 9 MG/ML
INJECTION, SOLUTION INTRAVENOUS CONTINUOUS
Status: DISCONTINUED | OUTPATIENT
Start: 2019-09-13 | End: 2019-09-13

## 2019-09-13 RX ORDER — NEOSTIGMINE METHYLSULFATE 0.5 MG/ML
INJECTION INTRAVENOUS AS NEEDED
Status: DISCONTINUED | OUTPATIENT
Start: 2019-09-13 | End: 2019-09-13 | Stop reason: SURG

## 2019-09-13 RX ORDER — HYDROCODONE BITARTRATE AND ACETAMINOPHEN 5; 325 MG/1; MG/1
1 TABLET ORAL AS NEEDED
Status: DISCONTINUED | OUTPATIENT
Start: 2019-09-13 | End: 2019-09-13

## 2019-09-13 RX ORDER — MORPHINE SULFATE 4 MG/ML
4 INJECTION, SOLUTION INTRAMUSCULAR; INTRAVENOUS EVERY 2 HOUR PRN
Status: DISCONTINUED | OUTPATIENT
Start: 2019-09-13 | End: 2019-09-13

## 2019-09-13 RX ORDER — ONDANSETRON 2 MG/ML
4 INJECTION INTRAMUSCULAR; INTRAVENOUS ONCE
Status: COMPLETED | OUTPATIENT
Start: 2019-09-13 | End: 2019-09-13

## 2019-09-13 RX ORDER — ACETAMINOPHEN 325 MG/1
650 TABLET ORAL EVERY 4 HOURS PRN
Status: DISCONTINUED | OUTPATIENT
Start: 2019-09-13 | End: 2019-09-13

## 2019-09-13 RX ORDER — HYDROCODONE BITARTRATE AND ACETAMINOPHEN 5; 325 MG/1; MG/1
1 TABLET ORAL EVERY 6 HOURS PRN
Qty: 20 TABLET | Refills: 0 | Status: SHIPPED | OUTPATIENT
Start: 2019-09-13 | End: 2020-02-10 | Stop reason: ALTCHOICE

## 2019-09-13 RX ORDER — MORPHINE SULFATE 10 MG/ML
6 INJECTION, SOLUTION INTRAMUSCULAR; INTRAVENOUS EVERY 10 MIN PRN
Status: DISCONTINUED | OUTPATIENT
Start: 2019-09-13 | End: 2019-09-13

## 2019-09-13 RX ADMIN — ROCURONIUM BROMIDE 40 MG: 10 INJECTION, SOLUTION INTRAVENOUS at 13:07:00

## 2019-09-13 RX ADMIN — LIDOCAINE HYDROCHLORIDE 50 MG: 10 INJECTION, SOLUTION EPIDURAL; INFILTRATION; INTRACAUDAL; PERINEURAL at 13:07:00

## 2019-09-13 RX ADMIN — MIDAZOLAM HYDROCHLORIDE 2 MG: 1 INJECTION INTRAMUSCULAR; INTRAVENOUS at 13:04:00

## 2019-09-13 RX ADMIN — DEXAMETHASONE SODIUM PHOSPHATE 4 MG: 4 MG/ML VIAL (ML) INJECTION at 13:10:00

## 2019-09-13 RX ADMIN — GLYCOPYRROLATE 0.2 MG: 0.2 INJECTION INTRAMUSCULAR; INTRAVENOUS at 13:10:00

## 2019-09-13 RX ADMIN — SODIUM CHLORIDE, SODIUM LACTATE, POTASSIUM CHLORIDE, CALCIUM CHLORIDE: 600; 310; 30; 20 INJECTION, SOLUTION INTRAVENOUS at 13:04:00

## 2019-09-13 RX ADMIN — ONDANSETRON 4 MG: 2 INJECTION INTRAMUSCULAR; INTRAVENOUS at 13:10:00

## 2019-09-13 RX ADMIN — GLYCOPYRROLATE 0.6 MG: 0.2 INJECTION INTRAMUSCULAR; INTRAVENOUS at 13:49:00

## 2019-09-13 RX ADMIN — NEOSTIGMINE METHYLSULFATE 4 MG: 0.5 INJECTION INTRAVENOUS at 13:49:00

## 2019-09-13 NOTE — ANESTHESIA POSTPROCEDURE EVALUATION
Patient: Miesha Green    Procedure Summary     Date:  09/13/19 Room / Location:  [facility] OR 01 / [facility] OR    Anesthesia Start:  4294 Anesthesia Stop:  9516    Procedure:  LAPAROSCOPIC APPENDECTOMY (N/A Abdomen) Diagnosis:       Appendicitis      (A

## 2019-09-13 NOTE — ED NOTES
Report given to pre-op RN. Patient left ED with transporter in stable condition with all belongings.

## 2019-09-13 NOTE — H&P
[facility]    PATIENT'S NAME: Scott Wallace   ATTENDING PHYSICIAN: Andre Avendaño MD   PATIENT ACCOUNT#:   [de-identified]    LOCATION:  [facility] OR [place] [address]  MEDICAL RECORD #:   [id number]       YOB: 2000  ADMISSION D edema.  NEUROLOGIC:  Grossly intact. LABORATORY DATA:  White blood cell count 12.5, glucose 115. Urinalysis fairly unremarkable.       Ultrasound of the pelvis showed a small fibroid tumor of the uterine fundus and small follicles in both ovaries withou

## 2019-09-13 NOTE — ED INITIAL ASSESSMENT (HPI)
Pt presents to ED for abdominal pain in the middle of her abdomen and vomiting since 1am. Pt denies diarrhea or fevers.

## 2019-09-13 NOTE — ANESTHESIA PREPROCEDURE EVALUATION
Anesthesia PreOp Note    HPI:     Jorden Harrison is a 22year old female who presents for preoperative consultation requested by: Gerhardt Angelica, MD    Date of Surgery: 9/13/2019    Procedure(s):  LAPAROSCOPIC APPENDECTOMY  Indication: Appendicitis Santo Singh Take 1 capsule (30 mg total) by mouth daily. Disp: 30 capsule Rfl: 0    Tretinoin 0.05 % External Cream Apply to acne  as directed at bedtime.  Disp: 20 g Rfl: 11 Taking   triamcinolone acetonide 0.1 % External Cream Use bid as directed Disp: 80 g Rfl: 0 Ta history.   Social History    Socioeconomic History      Marital status: Single      Spouse name: Not on file      Number of children: Not on file      Years of education: Not on file      Highest education level: Not on file    Occupational History      Not Seat Belt: Not Asked        Self-Exams: Not Asked    Social History Narrative      Not on file      Available pre-op labs reviewed.   Lab Results   Component Value Date    WBC 12.5 (H) 09/13/2019    RBC 4.70 09/13/2019    HGB 14.2 09/13/2019    HCT 42.1 0 management. All of the patient's questions were answered to the best of my ability. The patient desires the anesthetic management as planned.   John STARK  9/13/2019 1:19 PM

## 2019-09-13 NOTE — ANESTHESIA PROCEDURE NOTES
Airway  Urgency: elective      General Information and Staff    Patient location during procedure: OR  Anesthesiologist: Cem Yeboah MD  Performed: anesthesiologist     Indications and Patient Condition  Indications for airway management: anesthesia

## 2019-09-13 NOTE — BRIEF OP NOTE
Pre-Operative Diagnosis: Appendicitis [K37]     Post-Operative Diagnosis: Appendicitis [K37]      Procedure Performed:   Procedure(s):  laparoscopic appendectomy    Surgeon(s) and Role:     Nadira Carson MD - Primary    Assistant(s):  Surgical Assistant

## 2019-09-13 NOTE — OR PREOP
Patient wishes to be discharged. Discharge instructions given to patient and father. Nausea is \"better\" and does not desire pain medication at this time. Assisted to dress and brought by wheelchair to fathers car for discharge.

## 2019-09-13 NOTE — ED NOTES
Pt c/o periumbilical pain which started tonight. Pt also noted emesis x1, but no longer feels nauseous. Denies constipation, diarrhea, fevers/chills, urinary sx.

## 2019-09-14 NOTE — OPERATIVE REPORT
[facility]    PATIENT'S NAME: Lady Ballard   ATTENDING PHYSICIAN: Ivan Hall MD   OPERATING PHYSICIAN: Ivan Hall MD   PATIENT ACCOUNT#:   [id number]    LOCATION:  [place] [address]  MEDICAL RECORD #:   [id number]       [address] [facility] stool.  The appendix was curled on itself in the right lower quadrant. It was dilated with fibrinous changes in its proximal half. The distal tip was mildly injected. There was no evidence for gangrene or perforation. There was no abscess.   The cecum a

## 2019-09-27 ENCOUNTER — OFFICE VISIT (OUTPATIENT)
Dept: SURGERY | Facility: CLINIC | Age: 19
End: 2019-09-27
Payer: COMMERCIAL

## 2019-09-27 VITALS — BODY MASS INDEX: 21.16 KG/M2 | WEIGHT: 127 LBS | HEIGHT: 65 IN

## 2019-09-27 DIAGNOSIS — K35.30 ACUTE APPENDICITIS WITH LOCALIZED PERITONITIS, WITHOUT PERFORATION, ABSCESS, OR GANGRENE: Primary | ICD-10-CM

## 2019-09-27 PROCEDURE — 99024 POSTOP FOLLOW-UP VISIT: CPT | Performed by: SURGERY

## 2019-09-27 NOTE — PROGRESS NOTES
Postoperative Patient Follow-up      9/27/2019    Carol Alon 22year old      HPI  Patient presents with:  Post-Op: Appendectomy on 9-13-19. Patient reports intermittent discomfort and increases with activity.   Patient denies ayo fever, vomiting an

## 2019-10-21 ENCOUNTER — OFFICE VISIT (OUTPATIENT)
Dept: SURGERY | Facility: CLINIC | Age: 19
End: 2019-10-21
Payer: COMMERCIAL

## 2019-10-21 VITALS
SYSTOLIC BLOOD PRESSURE: 132 MMHG | HEIGHT: 65 IN | DIASTOLIC BLOOD PRESSURE: 92 MMHG | BODY MASS INDEX: 20.99 KG/M2 | HEART RATE: 116 BPM | WEIGHT: 126 LBS

## 2019-10-21 DIAGNOSIS — R63.2 BINGE EATING: ICD-10-CM

## 2019-10-21 DIAGNOSIS — E55.9 VITAMIN D DEFICIENCY: ICD-10-CM

## 2019-10-21 DIAGNOSIS — R63.2 INCREASED APPETITE: Primary | ICD-10-CM

## 2019-10-21 DIAGNOSIS — Z51.81 ENCOUNTER FOR THERAPEUTIC DRUG MONITORING: ICD-10-CM

## 2019-10-21 DIAGNOSIS — R53.82 CHRONIC FATIGUE: ICD-10-CM

## 2019-10-21 PROCEDURE — 99214 OFFICE O/P EST MOD 30 MIN: CPT | Performed by: INTERNAL MEDICINE

## 2019-10-21 NOTE — PROGRESS NOTES
[address], [address] [facility] [address]  Dept: [phone]     Date:   4/3/2017    Patient:  Jodi Dinh  :      3/19/2000  MRN:      [id number]    Chief Complaint: directed, Disp: 80 g, Rfl: 0  Azelaic Acid (FINACEA) 15 % External Foam, Apply 1 Application topically daily. , Disp: 50 g, Rfl: 12  selenium sulfide 2.5 % External Lotion, Topically as directed, Disp: 150 mL, Rfl: 1  acyclovir 400 MG Oral Tab, as needed. file    Other Topics      Concerns:         Service: Not Asked        Blood Transfusions: Not Asked        Caffeine Concern: Yes          2 cups daily coffee or tea        Occupational Exposure: Not Asked        Hobby Hazards: Not Asked        Slee Eat slowly and take 20 to 30 minutes to complete each meal    Exercise Goals Reviewed and Discussed    Walk for  45 Minutes and Treadmill for  45 Minutes    ROS:    Constitutional: negative  Respiratory: negative  Cardiovascular: negative  Gastrointestinal

## 2019-12-11 NOTE — PROGRESS NOTES
[facility]ation [facility]       Date of Consult:  3/6/2017    Patient:  Amada Hardwick  :      3/19/2000  MRN:      [id number]    Referring Provider: Dr. Castillo ref.  provider found       Chief Complaint:  Patient presents with:  Con Apply to the entire face, chest and back as directed at bedtime. Disp: 45 g Rfl: 6   RETIN-A MICRO PUMP 0.08 % External Gel Apply 1 Application topically daily.  Apply 1 pump daily Disp: 50 g Rfl: 6   CLINDAMYCIN PHOSPHATE 1 % External Lotion APPLY 2 TIMES breakfast, Eat 3 meals per day, Plan meals in advance, Read nutrition labels, Drink 64oz of water per day, Maintain a daily food journal, No drinking 30 minutes before or after meals, Utilize portion control strategies to reduce calorie intake, Identify tr 5-6 per day.       Will start Vyvanse for her eating habits  Should not start until she meets with endo team    Elevated prolactin levels noted    Needs ekg    Follow up in one month no

## 2020-01-19 NOTE — PROGRESS NOTES
[address] [place] [address] 91 [facility] [address]  Dept: [phone]     Date:   4/3/2017    Patient:  Christina Rutherford  :      3/19/2000  MRN:      [id number]    Chief Complaint: Hospital Medicine History & Physical      PCP: Steven Gan MD    Date of Admission: 1/18/2020    Date of Service: Pt seen/examined on 1/18/2020     Chief Complaint:    Chief Complaint   Patient presents with    Abdominal Pain     states started yesterday and comes and goes       History Of Present Illness: The patient is a 78 y.o. male with DM type 2, RLS, hypertension, hyperlipidemia, Cirrhosis, CKD stage 3, CAD, BPH, and prostate cancer who presents to [facility] with c/o generalized abdominal pain. Onset was yesterday. The pain is intermittent. Associated with nausea. Denies fevers, chills, vomiting, diarrhea, cough, chest pain, dyspnea, dysuria, or hematuria. Labs with mild leukocytosis. Patient fount to have UTI and small bowel obstruction. Patient admitted to med-surg. General surgery consulted. Past Medical History:        Diagnosis Date    Adenocarcinoma of prostate ([facility] 12/18/2019    BPH (benign prostatic hypertrophy)     CAD (coronary artery disease)     Chronic kidney disease (CKD), stage III (moderate) (HCC)     Cirrhosis of liver ([facility]     Diabetes mellitus type 2, controlled ([address] 7/28/2015    Hyperlipidemia     Hyperlipidemia associated with type 2 diabetes mellitus ([address] 11/30/2015    Hypertension     Neoplasm of uncertain behavior of skin 8/19/2011    Osteoarthrosis     Renal failure     Restless legs syndrome     Type II or unspecified type diabetes mellitus without mention of complication, not stated as uncontrolled        Past Surgical History:        Procedure Laterality Date    TURP  08/14/2019    TURP N/A 8/14/2019    CYSTOSCOPY TRANSURETHRAL RESECTION PROSTATE, CYSTOLITHOPAXY performed by Tatiana Villa MD at [facility] OR       Medications Prior to Admission:    Prior to Admission medications    Medication Sig Start Date End Date Taking?  Authorizing Provider   ferrous sulfate 325 (65 Fe) MG tablet Take 325 mg by mouth every other day Apply 1 pump daily Disp: 50 g Rfl: 6   CLINDAMYCIN PHOSPHATE 1 % External Lotion APPLY 2 TIMES EVERY DAY A THIN FILM TO THE AFFECTED AREA(S) Disp: 60 mL Rfl: 5   adapalene (DIFFERIN) 0.1 % External Cream Apply 1 Application topically nightly.  Disp: 45 g Rf Discussed  Eat breakfast, Eat 3 meals per day, Plan meals in advance, Read nutrition labels, Drink 64 oz of water per day, Maintain a daily food journal, No drinking 30 minutes before or after meals, Utlize portion control strategies to reduce calorie [employer] organomegaly, BS sluggish  Extremities: No edema or cyanosis. Distal pulses well felt  Neurological : grossly normal      M/S: No cyanosis. No joint deformity. No clubbing. Neuro: Awake. Grossly nonfocal    Psych: Oriented x 3. No anxiety or agitation. CBC:   Recent Labs     01/18/20 0115   WBC 12.2*   HGB 12.7*   HCT 41.5   MCV 79.1*        BMP:   Recent Labs     01/18/20 0115      K 4.4      CO2 22   BUN 28*   CREATININE 1.1     LIVER PROFILE:   Recent Labs     01/18/20 0115   AST 27   ALT 8*   LIPASE 11.0*   BILITOT 0.6   ALKPHOS 104     PT/INR:   Recent Labs     01/18/20 0115   PROTIME 12.4   INR 1.07     UA:  Recent Labs     01/18/20  0200   COLORU Yellow   PHUR 7.0   WBCUA 10-20*   RBCUA 3-5*   BACTERIA 3+*   CLARITYU CLOUDY*   SPECGRAV 1.020   LEUKOCYTESUR LARGE*   UROBILINOGEN 1.0   BILIRUBINUR Negative   BLOODU SMALL*   GLUCOSEU Negative   AMORPHOUS Rare*       CULTURES  Blood- pending    EKG:  I have reviewed the EKG with the following interpretation:   Sinus tachycardia with 1st degree A-V block    RADIOLOGY  CT ABDOMEN PELVIS WO CONTRAST Additional Contrast? None   Final Result   1. Findings suggest small bowel obstruction with the transition point in the   terminal ileum where wall thickening is suspected. 2. Diverticulosis without scan evidence for diverticulitis. 3. Bladder wall thickening may represent chronic outlet obstruction or   cystitis. Active Problems:    SBO (small bowel obstruction) (HCC)    Partial small bowel obstruction (HCC)  Resolved Problems:    * No resolved hospital problems. *        ASSESSMENT/PLAN:  Small bowel obstruction  - patient presented with c/o generalized abdominal pain, nausea  - found to have small bowel obstruction  - admitted to med-surg. General surgery consulted  - NPO, IVFs  - morphine as needed for pain. Zofran as needed for nausea. Leukocytosis due to   UTI  - cover with Rocephin D#1. Receiving IVFs.   - urine cx added on    DM type 2  - monitor glucose. SSI coverage. Hypertension  - BP stable. Holding home medications. CKD stage 3  - stable. Monitor BMP    Prostate cancer  - no treatment planned    CAD  - holding home medications  - denies chest pain.      DVT Prophylaxis: Lovenox  Diet: Diet NPO Effective Now  Code Status: Full Code    Niyah Alcantara MD 1/18/2020 7:43 PM fruit juices or regular soda. 3. Increase activity-upper body exercises, walk 10 minutes per day. 4. Increase fruit and vegetable servings to 5-6 per day.       Feels much better with vyvanse  Tolerating well      Worked up for elevated prolactin by endo

## 2020-02-10 ENCOUNTER — OFFICE VISIT (OUTPATIENT)
Dept: SURGERY | Facility: CLINIC | Age: 20
End: 2020-02-10
Payer: COMMERCIAL

## 2020-02-10 VITALS
OXYGEN SATURATION: 99 % | HEIGHT: 65 IN | BODY MASS INDEX: 27.66 KG/M2 | DIASTOLIC BLOOD PRESSURE: 78 MMHG | WEIGHT: 166 LBS | SYSTOLIC BLOOD PRESSURE: 130 MMHG | HEART RATE: 91 BPM

## 2020-02-10 DIAGNOSIS — E55.9 VITAMIN D DEFICIENCY: ICD-10-CM

## 2020-02-10 DIAGNOSIS — R63.2 INCREASED APPETITE: ICD-10-CM

## 2020-02-10 DIAGNOSIS — R63.2 BINGE EATING: Primary | ICD-10-CM

## 2020-02-10 DIAGNOSIS — Z51.81 ENCOUNTER FOR THERAPEUTIC DRUG MONITORING: ICD-10-CM

## 2020-02-10 PROCEDURE — 99214 OFFICE O/P EST MOD 30 MIN: CPT | Performed by: INTERNAL MEDICINE

## 2020-02-10 RX ORDER — MINOCYCLINE HYDROCHLORIDE 100 MG/1
CAPSULE ORAL
COMMUNITY
Start: 2020-01-13 | End: 2020-02-10 | Stop reason: ALTCHOICE

## 2020-02-10 NOTE — PROGRESS NOTES
[address], [facility]  181 [facility] 91 [facility] [address]  Dept: [phone]     Date:   4/3/2017    Patient:  Racquel Rene  :      3/19/2000  MRN:      [id number]    Chief Complaint: acyclovir 400 MG Oral Tab as needed. • Levocetirizine Dihydrochloride 5 MG Oral Tab Take 1 tablet (5 mg total) by mouth nightly. 30 tablet 3     Allergies:  Cats Claw, Uncaria Tomentosa; Dog Dander [Dander];  Mold     Social History:    Social History Asked        Sleep Concern: Not Asked        Stress Concern: Not Asked        Weight Concern: Not Asked        Special Diet: Not Asked        Back Care: Not Asked        Exercise: Yes          gym class        Bike Helmet: Not Asked        Seat Belt: Not A negative  Gastrointestinal: negative  Musculoskeletal:negative  Neurological: negative  Behavioral/Psych: negative  All other systems were reviewed and are negative    Physical Exam:   General appearance: alert, appears stated age and cooperative  Head: No

## 2020-03-10 NOTE — TELEPHONE ENCOUNTER
Requested Prescriptions     Pending Prescriptions Disp Refills   • acyclovir 400 MG Oral Tab  0     Sig: as needed.      Last office visit: 2-18-19  Medication last refilled: 5-7-18

## 2020-03-11 RX ORDER — ACYCLOVIR 400 MG/1
400 TABLET ORAL
Qty: 30 TABLET | Refills: 3 | Status: SHIPPED | OUTPATIENT
Start: 2020-03-11 | End: 2020-08-24

## 2020-06-01 ENCOUNTER — OFFICE VISIT (OUTPATIENT)
Dept: SURGERY | Facility: CLINIC | Age: 20
End: 2020-06-01
Payer: COMMERCIAL

## 2020-06-01 VITALS — BODY MASS INDEX: 20.99 KG/M2 | WEIGHT: 126 LBS | HEIGHT: 65 IN

## 2020-06-01 DIAGNOSIS — R63.2 BINGE EATING: ICD-10-CM

## 2020-06-01 DIAGNOSIS — R63.2 INCREASED APPETITE: Primary | ICD-10-CM

## 2020-06-01 DIAGNOSIS — Z51.81 ENCOUNTER FOR THERAPEUTIC DRUG MONITORING: ICD-10-CM

## 2020-06-01 DIAGNOSIS — R53.82 CHRONIC FATIGUE: ICD-10-CM

## 2020-06-01 PROCEDURE — 99213 OFFICE O/P EST LOW 20 MIN: CPT | Performed by: INTERNAL MEDICINE

## 2020-06-01 NOTE — PROGRESS NOTES
[address] [place] [address],[address]  Dept: [phone]     Virtual Telephone Check-In    Abel Quiroga verbally consents to a Virtual/Telephone Check-In visit on 06/01 tablet (0.35 mg total) by mouth daily. 3 Package 4   • Tretinoin 0.05 % External Cream Apply to acne  as directed at bedtime.  20 g 11   • triamcinolone acetonide 0.1 % External Cream Use bid as directed 80 g 0   • Azelaic Acid (FINACEA) 15 % External Foam abused: Not on file        Physically abused: Not on file        Forced sexual activity: Not on file    Other Topics      Concerns:         Service: Not Asked        Blood Transfusions: Not Asked        Caffeine Concern: Yes          2 cups daily c portion control strategies to reduce calorie intake, Identify triggers for eating and manage cues and Eat slowly and take 20 to 30 minutes to complete each meal    Exercise Goals Reviewed and Discussed    Walk for  45 Minutes and Treadmill for  45 Minutes

## 2020-08-10 ENCOUNTER — OFFICE VISIT (OUTPATIENT)
Dept: SURGERY | Facility: CLINIC | Age: 20
End: 2020-08-10
Payer: COMMERCIAL

## 2020-08-10 VITALS
BODY MASS INDEX: 21.22 KG/M2 | HEART RATE: 121 BPM | DIASTOLIC BLOOD PRESSURE: 89 MMHG | WEIGHT: 127.38 LBS | SYSTOLIC BLOOD PRESSURE: 147 MMHG | HEIGHT: 65 IN | OXYGEN SATURATION: 100 %

## 2020-08-10 DIAGNOSIS — Z51.81 ENCOUNTER FOR THERAPEUTIC DRUG MONITORING: ICD-10-CM

## 2020-08-10 DIAGNOSIS — R63.2 BINGE EATING: Primary | ICD-10-CM

## 2020-08-10 DIAGNOSIS — R53.82 CHRONIC FATIGUE: ICD-10-CM

## 2020-08-10 PROCEDURE — 3079F DIAST BP 80-89 MM HG: CPT | Performed by: INTERNAL MEDICINE

## 2020-08-10 PROCEDURE — 99214 OFFICE O/P EST MOD 30 MIN: CPT | Performed by: INTERNAL MEDICINE

## 2020-08-10 PROCEDURE — 3008F BODY MASS INDEX DOCD: CPT | Performed by: INTERNAL MEDICINE

## 2020-08-10 PROCEDURE — 3077F SYST BP >= 140 MM HG: CPT | Performed by: INTERNAL MEDICINE

## 2020-08-10 NOTE — PROGRESS NOTES
[address] 91 [facility] [address]  Dept: [phone]     Date:   4/3/2017    Patient:  Alberto Narvaez  :      3/19/2000  MRN:      [id number]    Chief Complaint: External Cream Use bid as directed 80 g 0   • Azelaic Acid (FINACEA) 15 % External Foam Apply 1 Application topically daily.  50 g 12   • selenium sulfide 2.5 % External Lotion Topically as directed 150 mL 1   • Levocetirizine Dihydrochloride 5 MG Oral Tab Blood Transfusions: Not Asked        Caffeine Concern: Yes          2 cups daily coffee or tea        Occupational Exposure: Not Asked        Hobby Hazards: Not Asked        Sleep Concern: Not Asked        Stress Concern: Not Asked        Weight Concern: N Reviewed and Discussed    Walk for  45 Minutes and Treadmill for  45 Minutes    ROS:    Constitutional: negative  Respiratory: negative  Cardiovascular: negative  Gastrointestinal: negative  Musculoskeletal:negative  Neurological: negative  Behavioral/Psyc

## 2020-08-25 RX ORDER — ACYCLOVIR 400 MG/1
400 TABLET ORAL
Qty: 30 TABLET | Refills: 3 | Status: SHIPPED | OUTPATIENT
Start: 2020-08-25 | End: 2021-08-02

## 2020-09-04 NOTE — TELEPHONE ENCOUNTER
Spoke with patient. Verified name and . Informed patient Tretinoin cream has been refill per Dr. Crowder Resides and per Dr. Crowder Resides will need an appointment when home from school break in the fall.  Patient verbalizes understanding and states she call back to lilly

## 2020-10-06 RX ORDER — NORETHINDRONE 0.35 MG/1
TABLET ORAL
Qty: 84 TABLET | Refills: 4 | OUTPATIENT
Start: 2020-10-06

## 2020-11-05 ENCOUNTER — TELEPHONE (OUTPATIENT)
Dept: OBGYN CLINIC | Facility: CLINIC | Age: 20
End: 2020-11-05

## 2020-11-05 NOTE — TELEPHONE ENCOUNTER
Last annual - 7/30/2019  Last pap - 7/30/2019, negative  Annual scheduled - 12/15/2020    Pt was given 3 packs with 4 refills on 7/30/2019    Message to [facility].   Ok to refill until annual?

## 2020-11-05 NOTE — TELEPHONE ENCOUNTER
Appointment with Dr. Valerio Last is not until 12/15/20. She is away at college and is asking for a birth control refill before her appointment because she is going to run out. Please advise.

## 2020-11-06 RX ORDER — ACETAMINOPHEN AND CODEINE PHOSPHATE 120; 12 MG/5ML; MG/5ML
0.35 SOLUTION ORAL DAILY
Qty: 1 PACKAGE | Refills: 1 | Status: SHIPPED | OUTPATIENT
Start: 2020-11-06 | End: 2021-01-07

## 2020-11-23 ENCOUNTER — OFFICE VISIT (OUTPATIENT)
Dept: SURGERY | Facility: CLINIC | Age: 20
End: 2020-11-23
Payer: COMMERCIAL

## 2020-11-23 VITALS
BODY MASS INDEX: 20.99 KG/M2 | HEART RATE: 107 BPM | DIASTOLIC BLOOD PRESSURE: 73 MMHG | OXYGEN SATURATION: 100 % | SYSTOLIC BLOOD PRESSURE: 125 MMHG | WEIGHT: 126 LBS | HEIGHT: 65 IN

## 2020-11-23 DIAGNOSIS — Z51.81 ENCOUNTER FOR THERAPEUTIC DRUG MONITORING: ICD-10-CM

## 2020-11-23 DIAGNOSIS — R63.2 INCREASED APPETITE: ICD-10-CM

## 2020-11-23 DIAGNOSIS — R63.2 BINGE EATING: Primary | ICD-10-CM

## 2020-11-23 DIAGNOSIS — R53.82 CHRONIC FATIGUE: ICD-10-CM

## 2020-11-23 PROCEDURE — 3078F DIAST BP <80 MM HG: CPT | Performed by: INTERNAL MEDICINE

## 2020-11-23 PROCEDURE — 99072 ADDL SUPL MATRL&STAF TM PHE: CPT | Performed by: INTERNAL MEDICINE

## 2020-11-23 PROCEDURE — 99214 OFFICE O/P EST MOD 30 MIN: CPT | Performed by: INTERNAL MEDICINE

## 2020-11-23 PROCEDURE — 3008F BODY MASS INDEX DOCD: CPT | Performed by: INTERNAL MEDICINE

## 2020-11-23 PROCEDURE — 3074F SYST BP LT 130 MM HG: CPT | Performed by: INTERNAL MEDICINE

## 2020-11-23 NOTE — PROGRESS NOTES
[address], [address]  Dept: [phone]       Patient:  Eward Ahumada  :      3/19/2000  MRN:      [id number]    Chief Complaint:  Patient presents w Topically as directed 150 mL 1   • Levocetirizine Dihydrochloride 5 MG Oral Tab Take 1 tablet (5 mg total) by mouth nightly. 30 tablet 3     Allergies:  Cats Claw, Uncaria Tomentosa;  Dog Dander [Dander]; and Mold     Social History:    Social History    So Sleep Concern: Not Asked        Stress Concern: Not Asked        Weight Concern: Not Asked        Special Diet: Not Asked        Back Care: Not Asked        Exercise: Yes          gym class        Bike Helmet: Not Asked        Seat Belt: Not Asked        S negative  Gastrointestinal: negative  Musculoskeletal:negative  Neurological: negative  Behavioral/Psych: negative  All other systems were reviewed and are negative    Physical Exam:   General appearance: alert, appears stated age and cooperative  Head: No

## 2020-12-15 ENCOUNTER — OFFICE VISIT (OUTPATIENT)
Dept: INTERNAL MEDICINE CLINIC | Facility: CLINIC | Age: 20
End: 2020-12-15
Payer: COMMERCIAL

## 2020-12-15 VITALS
HEART RATE: 88 BPM | OXYGEN SATURATION: 99 % | BODY MASS INDEX: 20.69 KG/M2 | WEIGHT: 124.19 LBS | HEIGHT: 65 IN | DIASTOLIC BLOOD PRESSURE: 70 MMHG | SYSTOLIC BLOOD PRESSURE: 122 MMHG

## 2020-12-15 DIAGNOSIS — Z00.00 WELLNESS EXAMINATION: Primary | ICD-10-CM

## 2020-12-15 PROCEDURE — 84439 ASSAY OF FREE THYROXINE: CPT | Performed by: INTERNAL MEDICINE

## 2020-12-15 PROCEDURE — 80053 COMPREHEN METABOLIC PANEL: CPT | Performed by: INTERNAL MEDICINE

## 2020-12-15 PROCEDURE — 99395 PREV VISIT EST AGE 18-39: CPT | Performed by: INTERNAL MEDICINE

## 2020-12-15 PROCEDURE — G8483 FLU IMM NO ADMIN DOC REA: HCPCS | Performed by: INTERNAL MEDICINE

## 2020-12-15 PROCEDURE — 84443 ASSAY THYROID STIM HORMONE: CPT | Performed by: INTERNAL MEDICINE

## 2020-12-15 PROCEDURE — 3074F SYST BP LT 130 MM HG: CPT | Performed by: INTERNAL MEDICINE

## 2020-12-15 PROCEDURE — 3008F BODY MASS INDEX DOCD: CPT | Performed by: INTERNAL MEDICINE

## 2020-12-15 PROCEDURE — 3078F DIAST BP <80 MM HG: CPT | Performed by: INTERNAL MEDICINE

## 2020-12-15 PROCEDURE — 85025 COMPLETE CBC W/AUTO DIFF WBC: CPT | Performed by: INTERNAL MEDICINE

## 2020-12-15 PROCEDURE — 82306 VITAMIN D 25 HYDROXY: CPT | Performed by: INTERNAL MEDICINE

## 2020-12-15 PROCEDURE — 80061 LIPID PANEL: CPT | Performed by: INTERNAL MEDICINE

## 2020-12-15 NOTE — PROGRESS NOTES
HPI:    Patient ID: Kat Noble is a 23year old female. HPIpt here for an annual check up and physical.  Is a  in psychology. Is managing weight on a low carb diet.     Review of Systems   Constitutional: Negative for fatigue and u and time. She appears well-developed and well-nourished. No distress. HENT:   Head: Normocephalic. Right Ear: External ear normal.   Left Ear: External ear normal.   Eyes: Pupils are equal, round, and reactive to light.  Conjunctivae are normal. No scle

## 2020-12-16 RX ORDER — ACETAMINOPHEN AND CODEINE PHOSPHATE 120; 12 MG/5ML; MG/5ML
SOLUTION ORAL
Qty: 28 TABLET | Refills: 1 | OUTPATIENT
Start: 2020-12-16

## 2020-12-16 NOTE — TELEPHONE ENCOUNTER
Refill request for OCP received from pharmacy. Pt's last annual was with Cesilia Turner on 7/30/19. Last pap negative on 7/30. No annual appt scheduled. Refill request denied. Pt needs to schedule annual before refill request can be addressed.

## 2021-01-07 ENCOUNTER — OFFICE VISIT (OUTPATIENT)
Dept: OBGYN CLINIC | Facility: CLINIC | Age: 21
End: 2021-01-07
Payer: COMMERCIAL

## 2021-01-07 VITALS
SYSTOLIC BLOOD PRESSURE: 132 MMHG | DIASTOLIC BLOOD PRESSURE: 85 MMHG | HEART RATE: 112 BPM | BODY MASS INDEX: 21 KG/M2 | WEIGHT: 124 LBS

## 2021-01-07 DIAGNOSIS — Z01.419 ENCOUNTER FOR GYNECOLOGICAL EXAMINATION WITHOUT ABNORMAL FINDING: Primary | ICD-10-CM

## 2021-01-07 DIAGNOSIS — Z30.41 ENCOUNTER FOR BIRTH CONTROL PILLS MAINTENANCE: ICD-10-CM

## 2021-01-07 PROCEDURE — 3075F SYST BP GE 130 - 139MM HG: CPT | Performed by: OBSTETRICS & GYNECOLOGY

## 2021-01-07 PROCEDURE — 3079F DIAST BP 80-89 MM HG: CPT | Performed by: OBSTETRICS & GYNECOLOGY

## 2021-01-07 PROCEDURE — 99395 PREV VISIT EST AGE 18-39: CPT | Performed by: OBSTETRICS & GYNECOLOGY

## 2021-01-07 RX ORDER — ACETAMINOPHEN AND CODEINE PHOSPHATE 120; 12 MG/5ML; MG/5ML
0.35 SOLUTION ORAL DAILY
Qty: 3 PACKAGE | Refills: 4 | Status: SHIPPED | OUTPATIENT
Start: 2021-01-07

## 2021-01-07 NOTE — PROGRESS NOTES
Well Woman Exam    HPI:  The patient is a 18yo female who presents today for annual exam. She has no concerns. Monthly menses on POP. She has dysmenorrhea first day. Declines STI screening.      Reviewed medical and surgical history below   OBSTETRICS HISTO status: Never Smoker      Smokeless tobacco: Never Used      Tobacco comment: No household smokers. Substance and Sexual Activity      Alcohol use:  Yes        Alcohol/week: 0.0 standard drinks        Comment: social      Drug use: No      Sexual Ralston bedtime. , Disp: 20 g, Rfl: 2  •  acyclovir 400 MG Oral Tab, Take 1 tablet (400 mg total) by mouth 5 (five) times daily. , Disp: 30 tablet, Rfl: 3  •  Levocetirizine Dihydrochloride 5 MG Oral Tab, Take 1 tablet (5 mg total) by mouth nightly., Disp: 30 tablet nondistended, no masses  Skin/Hair: no unusual rashes or bruises  Extremities: no edema, no cyanosis  Psychiatric: Appropriate mood and affect    Pelvic Exam:  External Genitalia: normal appearance, hair distribution, and no lesions  Urethral Meatus:  norm

## 2021-02-19 ENCOUNTER — OFFICE VISIT (OUTPATIENT)
Dept: SURGERY | Facility: CLINIC | Age: 21
End: 2021-02-19
Payer: COMMERCIAL

## 2021-02-19 VITALS
OXYGEN SATURATION: 99 % | HEART RATE: 92 BPM | SYSTOLIC BLOOD PRESSURE: 144 MMHG | BODY MASS INDEX: 20.83 KG/M2 | DIASTOLIC BLOOD PRESSURE: 95 MMHG | HEIGHT: 65 IN | WEIGHT: 125 LBS

## 2021-02-19 DIAGNOSIS — E55.9 VITAMIN D DEFICIENCY: ICD-10-CM

## 2021-02-19 DIAGNOSIS — R63.2 BINGE EATING: Primary | ICD-10-CM

## 2021-02-19 DIAGNOSIS — R63.2 INCREASED APPETITE: ICD-10-CM

## 2021-02-19 DIAGNOSIS — Z51.81 ENCOUNTER FOR THERAPEUTIC DRUG MONITORING: ICD-10-CM

## 2021-02-19 PROCEDURE — 99214 OFFICE O/P EST MOD 30 MIN: CPT | Performed by: INTERNAL MEDICINE

## 2021-02-19 PROCEDURE — 3008F BODY MASS INDEX DOCD: CPT | Performed by: INTERNAL MEDICINE

## 2021-02-19 NOTE — PROGRESS NOTES
[address]  Dept: [phone]       Patient:  Tamika Dye  :      3/19/2000  MRN:      [id number]    Chief Complaint:  Patient presents w External Cream Use bid as directed (Patient not taking: Reported on 1/7/2021 ) 80 g 0   • Azelaic Acid (FINACEA) 15 % External Foam Apply 1 Application topically daily.  (Patient not taking: Reported on 1/7/2021 ) 50 g 12   • selenium sulfide 2.5 % External abused: Not on file        Forced sexual activity: Not on file    Other Topics      Concerns:         Service: Not Asked        Blood Transfusions: Not Asked        Caffeine Concern: Yes          2 cups daily coffee or tea        Occupational Expos triggers for eating and manage cues and Eat slowly and take 20 to 30 minutes to complete each meal    Exercise Goals Reviewed and Discussed    Walk for  45 Minutes and Treadmill for  45 Minutes    ROS:    Constitutional: negative  Respiratory: negative  Ca

## 2021-05-04 ENCOUNTER — OFFICE VISIT (OUTPATIENT)
Dept: SURGERY | Facility: CLINIC | Age: 21
End: 2021-05-04
Payer: COMMERCIAL

## 2021-05-04 VITALS
WEIGHT: 124.13 LBS | BODY MASS INDEX: 20.68 KG/M2 | SYSTOLIC BLOOD PRESSURE: 117 MMHG | OXYGEN SATURATION: 100 % | HEIGHT: 65 IN | HEART RATE: 122 BPM | DIASTOLIC BLOOD PRESSURE: 88 MMHG

## 2021-05-04 DIAGNOSIS — R63.2 INCREASED APPETITE: ICD-10-CM

## 2021-05-04 DIAGNOSIS — R53.82 CHRONIC FATIGUE: ICD-10-CM

## 2021-05-04 DIAGNOSIS — R63.2 BINGE EATING: Primary | ICD-10-CM

## 2021-05-04 DIAGNOSIS — Z51.81 ENCOUNTER FOR THERAPEUTIC DRUG MONITORING: ICD-10-CM

## 2021-05-04 PROCEDURE — 3074F SYST BP LT 130 MM HG: CPT | Performed by: INTERNAL MEDICINE

## 2021-05-04 PROCEDURE — 3008F BODY MASS INDEX DOCD: CPT | Performed by: INTERNAL MEDICINE

## 2021-05-04 PROCEDURE — 3079F DIAST BP 80-89 MM HG: CPT | Performed by: INTERNAL MEDICINE

## 2021-05-04 PROCEDURE — 99214 OFFICE O/P EST MOD 30 MIN: CPT | Performed by: INTERNAL MEDICINE

## 2021-05-04 NOTE — PROGRESS NOTES
[address]  Dept: [phone]       Patient:  Denisse Causey  :      3/19/2000  MRN:      [id number]    Chief Complaint:  Patient presents w 80 g 0   • Azelaic Acid (FINACEA) 15 % External Foam Apply 1 Application topically daily.  (Patient not taking: Reported on 1/7/2021 ) 50 g 12   • selenium sulfide 2.5 % External Lotion Topically as directed (Patient not taking: Reported on 1/7/2021 ) 150 m (Non-Medical):   Physical Activity:       Days of Exercise per Week:       Minutes of Exercise per Session:   Stress:       Feeling of Stress :   Social Connections:       Frequency of Communication with Friends and Family:       Frequency of Social Gather Discussed    Walk for  45 Minutes and Treadmill for  45 Minutes    ROS:    Constitutional: negative  Respiratory: negative  Cardiovascular: negative  Gastrointestinal: negative  Musculoskeletal:negative  Neurological: negative  Behavioral/Psych: negative

## 2021-08-02 RX ORDER — ACYCLOVIR 400 MG/1
400 TABLET ORAL
Qty: 30 TABLET | Refills: 3 | Status: SHIPPED | OUTPATIENT
Start: 2021-08-02 | End: 2022-01-26

## 2021-08-27 ENCOUNTER — OFFICE VISIT (OUTPATIENT)
Dept: SURGERY | Facility: CLINIC | Age: 21
End: 2021-08-27
Payer: COMMERCIAL

## 2021-08-27 VITALS
HEART RATE: 102 BPM | WEIGHT: 125.38 LBS | DIASTOLIC BLOOD PRESSURE: 80 MMHG | BODY MASS INDEX: 20.89 KG/M2 | OXYGEN SATURATION: 99 % | SYSTOLIC BLOOD PRESSURE: 121 MMHG | HEIGHT: 65 IN

## 2021-08-27 DIAGNOSIS — R63.2 BINGE EATING: Primary | ICD-10-CM

## 2021-08-27 DIAGNOSIS — R63.2 INCREASED APPETITE: ICD-10-CM

## 2021-08-27 DIAGNOSIS — R53.82 CHRONIC FATIGUE: ICD-10-CM

## 2021-08-27 DIAGNOSIS — Z51.81 ENCOUNTER FOR THERAPEUTIC DRUG MONITORING: ICD-10-CM

## 2021-08-27 DIAGNOSIS — E55.9 VITAMIN D DEFICIENCY: ICD-10-CM

## 2021-08-27 PROCEDURE — 3008F BODY MASS INDEX DOCD: CPT | Performed by: INTERNAL MEDICINE

## 2021-08-27 PROCEDURE — 3079F DIAST BP 80-89 MM HG: CPT | Performed by: INTERNAL MEDICINE

## 2021-08-27 PROCEDURE — 99214 OFFICE O/P EST MOD 30 MIN: CPT | Performed by: INTERNAL MEDICINE

## 2021-08-27 PROCEDURE — 3074F SYST BP LT 130 MM HG: CPT | Performed by: INTERNAL MEDICINE

## 2021-08-27 NOTE — PROGRESS NOTES
[address]  Dept: [phone]       Patient:  Abel Quiroga  :      3/19/2000  MRN:      [id number]    Chief Complaint:  Patient presents w tablet 3     Allergies:  Cats Claw, Uncaria Tomentosa;  Dog Dander [Dander]; and Mold     Social History:    Social History    Socioeconomic History      Marital status: Single      Spouse name: Not on file      Number of children: Not on file      Years of   Attends Club or Organization Meetings:       Marital Status:   Intimate Partner Violence:       Fear of Current or Ex-Partner:       Emotionally Abused:       Physically Abused:       Sexually Abused:   Surgical History:    Past Surgical History:   Proce Normocephalic, without obvious abnormality, atraumatic  Eyes: conjunctivae/corneas clear. PERRL, EOM's intact. Fundi benign.   Lungs: clear to auscultation bilaterally  Heart: S1, S2 normal, no murmur, click, rub or gallop, regular rate and rhythm  Abdomen:

## 2021-11-24 ENCOUNTER — OFFICE VISIT (OUTPATIENT)
Dept: SURGERY | Facility: CLINIC | Age: 21
End: 2021-11-24
Payer: COMMERCIAL

## 2021-11-24 ENCOUNTER — LAB ENCOUNTER (OUTPATIENT)
Dept: LAB | Facility: HOSPITAL | Age: 21
End: 2021-11-24
Attending: INTERNAL MEDICINE
Payer: COMMERCIAL

## 2021-11-24 VITALS
HEART RATE: 87 BPM | RESPIRATION RATE: 16 BRPM | SYSTOLIC BLOOD PRESSURE: 126 MMHG | BODY MASS INDEX: 20.66 KG/M2 | WEIGHT: 124 LBS | HEIGHT: 65 IN | OXYGEN SATURATION: 100 % | DIASTOLIC BLOOD PRESSURE: 87 MMHG

## 2021-11-24 DIAGNOSIS — R53.82 CHRONIC FATIGUE: ICD-10-CM

## 2021-11-24 DIAGNOSIS — Z51.81 ENCOUNTER FOR THERAPEUTIC DRUG MONITORING: ICD-10-CM

## 2021-11-24 DIAGNOSIS — R63.2 BINGE EATING: ICD-10-CM

## 2021-11-24 DIAGNOSIS — R63.2 BINGE EATING: Primary | ICD-10-CM

## 2021-11-24 PROCEDURE — 3008F BODY MASS INDEX DOCD: CPT | Performed by: INTERNAL MEDICINE

## 2021-11-24 PROCEDURE — 99214 OFFICE O/P EST MOD 30 MIN: CPT | Performed by: INTERNAL MEDICINE

## 2021-11-24 PROCEDURE — 3074F SYST BP LT 130 MM HG: CPT | Performed by: INTERNAL MEDICINE

## 2021-11-24 PROCEDURE — 3079F DIAST BP 80-89 MM HG: CPT | Performed by: INTERNAL MEDICINE

## 2021-11-24 PROCEDURE — 93010 ELECTROCARDIOGRAM REPORT: CPT | Performed by: INTERNAL MEDICINE

## 2021-11-24 PROCEDURE — 93005 ELECTROCARDIOGRAM TRACING: CPT

## 2021-11-24 NOTE — PROGRESS NOTES
[address]  Dept: [phone]       Patient:  Jorden Harrison  :      3/19/2000  MRN:      [id number]    Chief Complaint:  Patient presents w (5 mg total) by mouth nightly. 30 tablet 3     Allergies:  Cats Claw, Uncaria Tomentosa;  Dog Dander [Dander]; and Mold     Social History:    Social History    Socioeconomic History      Marital status: Single      Spouse name: Not on file      Number of c CHO Intake: 100  · Is patient exercising? yes  · Type of exercise?  Run 3/week  · Core   · legs    Eating Habits  · Patient states the following:  · Eats 3 meal(s) per day  · Length of time it takes to consume a meal:  20  · # of snacks per day: 1 Type of s eating  (primary encounter diagnosis)  Encounter for therapeutic drug monitoring  Chronic fatigue    PLAN   No orders of the defined types were placed in this encounter. Patient is not interested in bariatric surgery.  Patient desires to pursue tradition

## 2021-12-22 NOTE — TELEPHONE ENCOUNTER
Madyson Marquez is requesting a refill of her vyvanse dated 12/25/2021 but would like it sent to [facility]

## 2022-01-25 ENCOUNTER — TELEPHONE (OUTPATIENT)
Dept: OBGYN CLINIC | Facility: CLINIC | Age: 22
End: 2022-01-25

## 2022-01-25 NOTE — TELEPHONE ENCOUNTER
"Chief Complaint   Patient presents with     UTI       Initial /78 (BP Location: Left arm, Patient Position: Sitting, Cuff Size: Adult Regular)  Pulse 104  Temp 98.6  F (37  C) (Tympanic)  Wt 170 lb (77.1 kg)  BMI 31.09 kg/m2 Estimated body mass index is 31.09 kg/(m^2) as calculated from the following:    Height as of 8/23/17: 5' 2\" (1.575 m).    Weight as of this encounter: 170 lb (77.1 kg).  Medication Reconciliation: complete     April HARVEY Bennett      " Received refill request via fax from [facility] for pts OCP Norethindrone. Pts last annual was 1/2021.  Request faxed back to [facility] denied with instructions for pt to call office to schedule annual.

## 2022-01-27 RX ORDER — ACYCLOVIR 400 MG/1
400 TABLET ORAL
Qty: 30 TABLET | Refills: 3 | Status: SHIPPED | OUTPATIENT
Start: 2022-01-27

## 2022-03-28 ENCOUNTER — TELEPHONE (OUTPATIENT)
Dept: OBGYN CLINIC | Facility: CLINIC | Age: 22
End: 2022-03-28

## 2022-03-28 RX ORDER — ACETAMINOPHEN AND CODEINE PHOSPHATE 120; 12 MG/5ML; MG/5ML
0.35 SOLUTION ORAL DAILY
Qty: 3 EACH | Refills: 0 | Status: SHIPPED | OUTPATIENT
Start: 2022-03-28

## 2022-03-28 NOTE — TELEPHONE ENCOUNTER
Pt calling for [facility] refill, last annual was with ARTHUR on 1/7/21.  Pt has apt on 4/25 with Belle Mendez

## 2022-04-22 ENCOUNTER — OFFICE VISIT (OUTPATIENT)
Dept: SURGERY | Facility: CLINIC | Age: 22
End: 2022-04-22
Payer: COMMERCIAL

## 2022-04-22 VITALS
OXYGEN SATURATION: 100 % | SYSTOLIC BLOOD PRESSURE: 123 MMHG | BODY MASS INDEX: 21.27 KG/M2 | HEIGHT: 65 IN | HEART RATE: 107 BPM | DIASTOLIC BLOOD PRESSURE: 85 MMHG | WEIGHT: 127.69 LBS

## 2022-04-22 DIAGNOSIS — R53.82 CHRONIC FATIGUE: ICD-10-CM

## 2022-04-22 DIAGNOSIS — Z51.81 ENCOUNTER FOR THERAPEUTIC DRUG MONITORING: ICD-10-CM

## 2022-04-22 DIAGNOSIS — R63.2 BINGE EATING: Primary | ICD-10-CM

## 2022-04-22 PROCEDURE — 3074F SYST BP LT 130 MM HG: CPT | Performed by: INTERNAL MEDICINE

## 2022-04-22 PROCEDURE — 3079F DIAST BP 80-89 MM HG: CPT | Performed by: INTERNAL MEDICINE

## 2022-04-22 PROCEDURE — 3008F BODY MASS INDEX DOCD: CPT | Performed by: INTERNAL MEDICINE

## 2022-04-22 PROCEDURE — 99214 OFFICE O/P EST MOD 30 MIN: CPT | Performed by: INTERNAL MEDICINE

## 2022-04-25 ENCOUNTER — OFFICE VISIT (OUTPATIENT)
Dept: OBGYN CLINIC | Facility: CLINIC | Age: 22
End: 2022-04-25
Payer: COMMERCIAL

## 2022-04-25 VITALS
HEART RATE: 88 BPM | DIASTOLIC BLOOD PRESSURE: 85 MMHG | HEIGHT: 65.5 IN | SYSTOLIC BLOOD PRESSURE: 125 MMHG | WEIGHT: 129.38 LBS | BODY MASS INDEX: 21.3 KG/M2

## 2022-04-25 DIAGNOSIS — Z12.4 SCREENING FOR CERVICAL CANCER: ICD-10-CM

## 2022-04-25 DIAGNOSIS — Z01.419 ENCOUNTER FOR ANNUAL ROUTINE GYNECOLOGICAL EXAMINATION: Primary | ICD-10-CM

## 2022-04-25 DIAGNOSIS — Z30.41 ORAL CONTRACEPTIVE PILL SURVEILLANCE: ICD-10-CM

## 2022-04-25 PROCEDURE — 3008F BODY MASS INDEX DOCD: CPT | Performed by: NURSE PRACTITIONER

## 2022-04-25 PROCEDURE — 3079F DIAST BP 80-89 MM HG: CPT | Performed by: NURSE PRACTITIONER

## 2022-04-25 PROCEDURE — 3074F SYST BP LT 130 MM HG: CPT | Performed by: NURSE PRACTITIONER

## 2022-04-25 PROCEDURE — 99395 PREV VISIT EST AGE 18-39: CPT | Performed by: NURSE PRACTITIONER

## 2022-04-25 RX ORDER — ACETAMINOPHEN AND CODEINE PHOSPHATE 120; 12 MG/5ML; MG/5ML
0.35 SOLUTION ORAL DAILY
Qty: 3 EACH | Refills: 3 | Status: SHIPPED | OUTPATIENT
Start: 2022-04-25

## 2022-07-21 ENCOUNTER — TELEPHONE (OUTPATIENT)
Dept: INTERNAL MEDICINE CLINIC | Facility: CLINIC | Age: 22
End: 2022-07-21

## 2022-07-21 NOTE — TELEPHONE ENCOUNTER
Patient is out of town not in the city but she is in the state of [place] she wants doctor to call her back she is not feeling good, negative covid, sore throat, hard to swallow, little bit of cough, no fever, she wants a prescription if possible please advice.

## 2022-07-21 NOTE — TELEPHONE ENCOUNTER
What test was taken for covid ? Home tests are not reliable. Best to go to pharmacy for a PCR. Do not know where she is, but there should be an Immediate Care or WIC nearby where she can go and be examined and treated.  This way you will be treated for cause of illness which is more effective

## 2022-07-21 NOTE — TELEPHONE ENCOUNTER
Patient called back, says she will be talking to a client between 12-1. Doesn't want to play phone tag. Said her insurance won't cover exam or testing. And she would have to drive 3 hours home to be covered by her insurance.

## 2022-08-01 DIAGNOSIS — R63.2 BINGE EATING: ICD-10-CM

## 2022-08-19 ENCOUNTER — VIRTUAL PHONE E/M (OUTPATIENT)
Dept: SURGERY | Facility: CLINIC | Age: 22
End: 2022-08-19

## 2022-08-19 VITALS — HEIGHT: 65.5 IN | WEIGHT: 129 LBS | BODY MASS INDEX: 21.23 KG/M2

## 2022-08-19 DIAGNOSIS — Z51.81 ENCOUNTER FOR THERAPEUTIC DRUG MONITORING: ICD-10-CM

## 2022-08-19 DIAGNOSIS — R53.82 CHRONIC FATIGUE: ICD-10-CM

## 2022-08-19 DIAGNOSIS — R63.2 BINGE EATING: Primary | ICD-10-CM

## 2022-08-19 PROCEDURE — 3008F BODY MASS INDEX DOCD: CPT | Performed by: INTERNAL MEDICINE

## 2022-08-19 PROCEDURE — 99214 OFFICE O/P EST MOD 30 MIN: CPT | Performed by: INTERNAL MEDICINE

## 2022-09-23 ENCOUNTER — OFFICE VISIT (OUTPATIENT)
Dept: FAMILY MEDICINE CLINIC | Facility: CLINIC | Age: 22
End: 2022-09-23

## 2022-09-23 VITALS
WEIGHT: 134 LBS | RESPIRATION RATE: 16 BRPM | TEMPERATURE: 97 F | DIASTOLIC BLOOD PRESSURE: 87 MMHG | SYSTOLIC BLOOD PRESSURE: 137 MMHG | HEIGHT: 65.5 IN | OXYGEN SATURATION: 100 % | HEART RATE: 96 BPM | BODY MASS INDEX: 22.06 KG/M2

## 2022-09-23 DIAGNOSIS — J02.9 VIRAL PHARYNGITIS: Primary | ICD-10-CM

## 2022-09-23 DIAGNOSIS — H65.93 MIDDLE EAR EFFUSION, BILATERAL: ICD-10-CM

## 2022-09-23 DIAGNOSIS — Z20.822 LAB TEST NEGATIVE FOR COVID-19 VIRUS: ICD-10-CM

## 2022-09-23 LAB
CONTROL LINE PRESENT WITH A CLEAR BACKGROUND (YES/NO): YES YES/NO
KIT LOT #: NORMAL NUMERIC
OPERATOR ID: NORMAL
POCT LOT NUMBER: NORMAL
RAPID SARS-COV-2 BY PCR: NOT DETECTED
STREP GRP A CUL-SCR: NEGATIVE

## 2022-09-23 PROCEDURE — 87880 STREP A ASSAY W/OPTIC: CPT | Performed by: PHYSICIAN ASSISTANT

## 2022-09-23 PROCEDURE — U0002 COVID-19 LAB TEST NON-CDC: HCPCS | Performed by: PHYSICIAN ASSISTANT

## 2022-09-23 PROCEDURE — 99213 OFFICE O/P EST LOW 20 MIN: CPT | Performed by: PHYSICIAN ASSISTANT

## 2022-09-23 PROCEDURE — 3079F DIAST BP 80-89 MM HG: CPT | Performed by: PHYSICIAN ASSISTANT

## 2022-09-23 PROCEDURE — 3008F BODY MASS INDEX DOCD: CPT | Performed by: PHYSICIAN ASSISTANT

## 2022-09-23 PROCEDURE — 3075F SYST BP GE 130 - 139MM HG: CPT | Performed by: PHYSICIAN ASSISTANT

## 2022-09-23 RX ORDER — PREDNISONE 20 MG/1
40 TABLET ORAL DAILY
Qty: 6 TABLET | Refills: 0 | Status: SHIPPED | OUTPATIENT
Start: 2022-09-23 | End: 2022-09-26

## 2022-12-23 ENCOUNTER — OFFICE VISIT (OUTPATIENT)
Dept: INTERNAL MEDICINE CLINIC | Facility: CLINIC | Age: 22
End: 2022-12-23
Payer: COMMERCIAL

## 2022-12-23 ENCOUNTER — OFFICE VISIT (OUTPATIENT)
Dept: SURGERY | Facility: CLINIC | Age: 22
End: 2022-12-23
Payer: COMMERCIAL

## 2022-12-23 VITALS
TEMPERATURE: 98 F | SYSTOLIC BLOOD PRESSURE: 122 MMHG | WEIGHT: 131 LBS | HEIGHT: 65 IN | OXYGEN SATURATION: 99 % | DIASTOLIC BLOOD PRESSURE: 72 MMHG | HEART RATE: 114 BPM | BODY MASS INDEX: 21.83 KG/M2

## 2022-12-23 VITALS
HEIGHT: 65 IN | OXYGEN SATURATION: 100 % | DIASTOLIC BLOOD PRESSURE: 88 MMHG | SYSTOLIC BLOOD PRESSURE: 131 MMHG | WEIGHT: 132 LBS | HEART RATE: 122 BPM | BODY MASS INDEX: 21.99 KG/M2

## 2022-12-23 DIAGNOSIS — Z13.1 SCREENING FOR DIABETES MELLITUS: ICD-10-CM

## 2022-12-23 DIAGNOSIS — R53.82 CHRONIC FATIGUE: ICD-10-CM

## 2022-12-23 DIAGNOSIS — F50.81 BINGE-EATING DISORDER, IN FULL REMISSION, MILD: ICD-10-CM

## 2022-12-23 DIAGNOSIS — E55.9 VITAMIN D DEFICIENCY: ICD-10-CM

## 2022-12-23 DIAGNOSIS — Z00.00 ANNUAL PHYSICAL EXAM: Primary | ICD-10-CM

## 2022-12-23 DIAGNOSIS — Z13.0 SCREENING FOR DEFICIENCY ANEMIA: ICD-10-CM

## 2022-12-23 DIAGNOSIS — Z13.220 SCREENING FOR LIPOID DISORDERS: ICD-10-CM

## 2022-12-23 DIAGNOSIS — J30.2 SEASONAL ALLERGIES: ICD-10-CM

## 2022-12-23 DIAGNOSIS — Z51.81 ENCOUNTER FOR THERAPEUTIC DRUG MONITORING: ICD-10-CM

## 2022-12-23 DIAGNOSIS — L70.8 OTHER ACNE: ICD-10-CM

## 2022-12-23 DIAGNOSIS — R63.2 BINGE EATING: Primary | ICD-10-CM

## 2022-12-23 DIAGNOSIS — Z13.29 SCREENING FOR THYROID DISORDER: ICD-10-CM

## 2022-12-23 PROBLEM — F43.9 STRESS: Status: ACTIVE | Noted: 2022-12-23

## 2022-12-23 PROCEDURE — 3079F DIAST BP 80-89 MM HG: CPT | Performed by: INTERNAL MEDICINE

## 2022-12-23 PROCEDURE — 99214 OFFICE O/P EST MOD 30 MIN: CPT | Performed by: INTERNAL MEDICINE

## 2022-12-23 PROCEDURE — 3008F BODY MASS INDEX DOCD: CPT | Performed by: INTERNAL MEDICINE

## 2022-12-23 PROCEDURE — 3075F SYST BP GE 130 - 139MM HG: CPT | Performed by: INTERNAL MEDICINE

## 2022-12-23 RX ORDER — ACYCLOVIR 400 MG/1
400 TABLET ORAL
Qty: 30 TABLET | Refills: 3 | Status: SHIPPED | OUTPATIENT
Start: 2022-12-23

## 2022-12-23 RX ORDER — TRETINOIN 0.5 MG/G
CREAM TOPICAL
Qty: 20 G | Refills: 2 | Status: SHIPPED | OUTPATIENT
Start: 2022-12-23

## 2022-12-23 RX ORDER — FLUTICASONE PROPIONATE 50 MCG
1 SPRAY, SUSPENSION (ML) NASAL DAILY
Qty: 3 EACH | Refills: 3 | Status: SHIPPED | OUTPATIENT
Start: 2022-12-23 | End: 2023-12-18

## 2022-12-23 NOTE — PATIENT INSTRUCTIONS
- You were seen in clinic for regular annual check-up. We have ordered labs for you and we will call you with the results. Please obtain the bloodwork fasting for 12 hours. OK to drink water the day of your blood draw  - We did refill your Tretinoin medication for further control of your acne. You may need to re-establish with Dermatology in the future  - For the recurrent cold sores, we have refilled your acyclovir on as needed  - For the recurrent sore throats, we should try flonase 1 spray each nostril until symptoms improve  - Continue following up with Dr. Yasemin Kaplan for management of nutrition intake. We discussed the potential side effects of long-term stimulant use which include high blood pressure, chest pain, palpitations, unintended weight loss due to poor appetite, insomnia, increased anxiety. We should do periodic check ins to make sure that it is still safe to continue stimulant medication. The goal is to control symptoms while minimizing side effects with the lowest effective dose and the shortest duration of time. - Please continue following up with NEVILLE Cabello. Your next pap smear will be in 3 years  - Please continue to eat a varied diet including recommended servings of vegetables, fruits, and low fat dairy. Minimize high saturated fats (such as fast foods) and high sugar intake (such as soda)  - We recommend 150 minutes of moderate intensity exercise (brisk walking, swimming) weekly to maintain your current weight. Targeted weight loss will require more vigorous exercise or more than 150 minutes/week.     Return to clinic in 6 months for follow-up

## 2023-01-05 ENCOUNTER — TELEPHONE (OUTPATIENT)
Dept: INTERNAL MEDICINE CLINIC | Facility: CLINIC | Age: 23
End: 2023-01-05

## 2023-01-05 NOTE — TELEPHONE ENCOUNTER
From [facility] message  Hi Dr. Gonzalez Orantes,  I was wondering if you would be able to prescribe either a generic/off-brand or a one month supply of the Flonase (fluticasone propionate 50 MCG/ACT Susp) for my as-needed throat concerns. My insurance did not do a great job of covering the 3 month, and since I am testing it out on an as-needed basis, I was hoping to possibly do the one-month prescription if possible.    Thank you,  -Martha Schultz

## 2023-01-05 NOTE — TELEPHONE ENCOUNTER
Left message to call back. Need clarification from patient regarding Mychart message. Spoke with pharmacy [facility] [place], patient RX is ready for  and will be $24 for 3 month supply. Not sure what issue patient is having with insurance and RX for [facility].

## 2023-01-06 NOTE — TELEPHONE ENCOUNTER
Spoke with patient provided information from pharmacy. Patient verbalized understading, will  prescription.  Instructed patient to give us a call if she had any additional questions or concerns

## 2023-03-24 ENCOUNTER — OFFICE VISIT (OUTPATIENT)
Dept: SURGERY | Facility: CLINIC | Age: 23
End: 2023-03-24
Payer: COMMERCIAL

## 2023-03-24 ENCOUNTER — LAB ENCOUNTER (OUTPATIENT)
Dept: LAB | Facility: HOSPITAL | Age: 23
End: 2023-03-24
Attending: INTERNAL MEDICINE
Payer: COMMERCIAL

## 2023-03-24 VITALS
HEART RATE: 117 BPM | OXYGEN SATURATION: 98 % | DIASTOLIC BLOOD PRESSURE: 78 MMHG | BODY MASS INDEX: 21.54 KG/M2 | SYSTOLIC BLOOD PRESSURE: 124 MMHG | HEIGHT: 65 IN | WEIGHT: 129.31 LBS

## 2023-03-24 DIAGNOSIS — R53.82 CHRONIC FATIGUE: ICD-10-CM

## 2023-03-24 DIAGNOSIS — Z13.0 SCREENING FOR DEFICIENCY ANEMIA: ICD-10-CM

## 2023-03-24 DIAGNOSIS — Z13.29 SCREENING FOR THYROID DISORDER: ICD-10-CM

## 2023-03-24 DIAGNOSIS — Z13.220 SCREENING FOR LIPOID DISORDERS: ICD-10-CM

## 2023-03-24 DIAGNOSIS — E55.9 VITAMIN D DEFICIENCY: ICD-10-CM

## 2023-03-24 DIAGNOSIS — Z51.81 ENCOUNTER FOR THERAPEUTIC DRUG MONITORING: ICD-10-CM

## 2023-03-24 DIAGNOSIS — R63.2 INCREASED APPETITE: ICD-10-CM

## 2023-03-24 DIAGNOSIS — F43.9 STRESS: ICD-10-CM

## 2023-03-24 DIAGNOSIS — F50.81 BINGE-EATING DISORDER, IN FULL REMISSION, MILD: ICD-10-CM

## 2023-03-24 DIAGNOSIS — R63.2 BINGE EATING: Primary | ICD-10-CM

## 2023-03-24 DIAGNOSIS — Z00.00 ANNUAL PHYSICAL EXAM: ICD-10-CM

## 2023-03-24 LAB
ALBUMIN SERPL-MCNC: 4.4 G/DL (ref 3.4–5)
ALBUMIN/GLOB SERPL: 1.2 {RATIO} (ref 1–2)
ALP LIVER SERPL-CCNC: 57 U/L
ALT SERPL-CCNC: 25 U/L
ANION GAP SERPL CALC-SCNC: 6 MMOL/L (ref 0–18)
AST SERPL-CCNC: 11 U/L (ref 15–37)
BASOPHILS # BLD AUTO: 0.03 X10(3) UL (ref 0–0.2)
BASOPHILS NFR BLD AUTO: 0.5 %
BILIRUB SERPL-MCNC: 0.5 MG/DL (ref 0.1–2)
BUN BLD-MCNC: 18 MG/DL (ref 7–18)
BUN/CREAT SERPL: 26.9 (ref 10–20)
CALCIUM BLD-MCNC: 9.1 MG/DL (ref 8.5–10.1)
CHLORIDE SERPL-SCNC: 108 MMOL/L (ref 98–112)
CHOLEST SERPL-MCNC: 159 MG/DL (ref ?–200)
CO2 SERPL-SCNC: 26 MMOL/L (ref 21–32)
CREAT BLD-MCNC: 0.67 MG/DL
DEPRECATED RDW RBC AUTO: 39.2 FL (ref 35.1–46.3)
EOSINOPHIL # BLD AUTO: 0.08 X10(3) UL (ref 0–0.7)
EOSINOPHIL NFR BLD AUTO: 1.3 %
ERYTHROCYTE [DISTWIDTH] IN BLOOD BY AUTOMATED COUNT: 12.2 % (ref 11–15)
FASTING PATIENT LIPID ANSWER: YES
FASTING STATUS PATIENT QL REPORTED: YES
GFR SERPLBLD BASED ON 1.73 SQ M-ARVRAT: 124 ML/MIN/1.73M2 (ref 60–?)
GLOBULIN PLAS-MCNC: 3.6 G/DL (ref 2.8–4.4)
GLUCOSE BLD-MCNC: 101 MG/DL (ref 70–99)
HCT VFR BLD AUTO: 40.8 %
HDLC SERPL-MCNC: 60 MG/DL (ref 40–59)
HGB BLD-MCNC: 13.7 G/DL
IMM GRANULOCYTES # BLD AUTO: 0.02 X10(3) UL (ref 0–1)
IMM GRANULOCYTES NFR BLD: 0.3 %
LDLC SERPL CALC-MCNC: 82 MG/DL (ref ?–100)
LYMPHOCYTES # BLD AUTO: 1.98 X10(3) UL (ref 1–4)
LYMPHOCYTES NFR BLD AUTO: 31.3 %
MCH RBC QN AUTO: 29.3 PG (ref 26–34)
MCHC RBC AUTO-ENTMCNC: 33.6 G/DL (ref 31–37)
MCV RBC AUTO: 87.4 FL
MONOCYTES # BLD AUTO: 0.33 X10(3) UL (ref 0.1–1)
MONOCYTES NFR BLD AUTO: 5.2 %
NEUTROPHILS # BLD AUTO: 3.88 X10 (3) UL (ref 1.5–7.7)
NEUTROPHILS # BLD AUTO: 3.88 X10(3) UL (ref 1.5–7.7)
NEUTROPHILS NFR BLD AUTO: 61.4 %
NONHDLC SERPL-MCNC: 99 MG/DL (ref ?–130)
OSMOLALITY SERPL CALC.SUM OF ELEC: 292 MOSM/KG (ref 275–295)
PLATELET # BLD AUTO: 244 10(3)UL (ref 150–450)
POTASSIUM SERPL-SCNC: 4.2 MMOL/L (ref 3.5–5.1)
PROT SERPL-MCNC: 8 G/DL (ref 6.4–8.2)
RBC # BLD AUTO: 4.67 X10(6)UL
SODIUM SERPL-SCNC: 140 MMOL/L (ref 136–145)
TRIGL SERPL-MCNC: 94 MG/DL (ref 30–149)
TSI SER-ACNC: 1.18 MIU/ML (ref 0.36–3.74)
VIT D+METAB SERPL-MCNC: 31 NG/ML (ref 30–100)
VLDLC SERPL CALC-MCNC: 15 MG/DL (ref 0–30)
WBC # BLD AUTO: 6.3 X10(3) UL (ref 4–11)

## 2023-03-24 PROCEDURE — 3078F DIAST BP <80 MM HG: CPT | Performed by: INTERNAL MEDICINE

## 2023-03-24 PROCEDURE — 82306 VITAMIN D 25 HYDROXY: CPT

## 2023-03-24 PROCEDURE — 3074F SYST BP LT 130 MM HG: CPT | Performed by: INTERNAL MEDICINE

## 2023-03-24 PROCEDURE — 99214 OFFICE O/P EST MOD 30 MIN: CPT | Performed by: INTERNAL MEDICINE

## 2023-03-24 PROCEDURE — 80061 LIPID PANEL: CPT

## 2023-03-24 PROCEDURE — 84443 ASSAY THYROID STIM HORMONE: CPT

## 2023-03-24 PROCEDURE — 80053 COMPREHEN METABOLIC PANEL: CPT

## 2023-03-24 PROCEDURE — 85025 COMPLETE CBC W/AUTO DIFF WBC: CPT

## 2023-03-24 PROCEDURE — 36415 COLL VENOUS BLD VENIPUNCTURE: CPT

## 2023-03-24 PROCEDURE — 3008F BODY MASS INDEX DOCD: CPT | Performed by: INTERNAL MEDICINE

## 2023-03-27 ENCOUNTER — TELEPHONE (OUTPATIENT)
Dept: INTERNAL MEDICINE CLINIC | Facility: CLINIC | Age: 23
End: 2023-03-27

## 2023-03-27 NOTE — TELEPHONE ENCOUNTER
Please notify patient that I reviewed the blood work from 3/24    Labs  Fasting sugar right at the borderline 101, (goal less than 100)  Mild dehydration on blood tests, but she was fasting  Vitamin D at goal        Recommendations  No new medications we need at this time.   We will focus on targeted weight loss with good nutrition, exercise as she has been doing

## 2023-04-11 ENCOUNTER — TELEPHONE (OUTPATIENT)
Dept: SURGERY | Facility: CLINIC | Age: 23
End: 2023-04-11

## 2023-04-11 DIAGNOSIS — R63.2 BINGE EATING: Primary | ICD-10-CM

## 2023-05-03 ENCOUNTER — PATIENT MESSAGE (OUTPATIENT)
Dept: INTERNAL MEDICINE CLINIC | Facility: CLINIC | Age: 23
End: 2023-05-03

## 2023-05-04 NOTE — TELEPHONE ENCOUNTER
From: Bishop Benton  To: Nicholas Maynard MD  Sent: 5/3/2023 6:29 PM CDT  Subject: Refill    Hi Dr. Nellie Hernandez,    I had an annual exam with you back in December and I remember you mentioning that you may be able to refill my birth control (the norethindrone I have on file from a NP in [facility]). I was wondering if you would be able to refill that for me to the pharmacy in [place], [place].     Thanks!  -Baudilio Reynoso

## 2023-05-05 ENCOUNTER — OFFICE VISIT (OUTPATIENT)
Dept: DERMATOLOGY CLINIC | Facility: CLINIC | Age: 23
End: 2023-05-05

## 2023-05-05 DIAGNOSIS — L30.9 DERMATITIS: Primary | ICD-10-CM

## 2023-05-05 DIAGNOSIS — L73.9 FOLLICULITIS: ICD-10-CM

## 2023-05-05 DIAGNOSIS — D18.01 HEMANGIOMA OF SKIN: ICD-10-CM

## 2023-05-05 PROCEDURE — 99203 OFFICE O/P NEW LOW 30 MIN: CPT | Performed by: PHYSICIAN ASSISTANT

## 2023-05-05 PROCEDURE — 17110 DESTRUCTION B9 LES UP TO 14: CPT | Performed by: PHYSICIAN ASSISTANT

## 2023-05-05 RX ORDER — CLINDAMYCIN PHOSPHATE 10 UG/ML
1 LOTION TOPICAL DAILY
Qty: 60 ML | Refills: 3 | Status: SHIPPED | OUTPATIENT
Start: 2023-05-05

## 2023-05-05 RX ORDER — ACETAMINOPHEN AND CODEINE PHOSPHATE 120; 12 MG/5ML; MG/5ML
0.35 SOLUTION ORAL DAILY
Qty: 3 EACH | Refills: 3 | Status: SHIPPED | OUTPATIENT
Start: 2023-05-05

## 2023-05-05 RX ORDER — METRONIDAZOLE 7.5 MG/G
1 GEL TOPICAL 2 TIMES DAILY
Qty: 45 G | Refills: 1 | Status: SHIPPED | OUTPATIENT
Start: 2023-05-05

## 2023-07-05 DIAGNOSIS — R63.2 BINGE EATING: ICD-10-CM

## 2023-07-28 RX ORDER — TRETINOIN 0.5 MG/G
CREAM TOPICAL
Qty: 20 G | Refills: 2 | OUTPATIENT
Start: 2023-07-28

## 2023-07-28 NOTE — TELEPHONE ENCOUNTER
Current refill request refused due to refill is either a duplicate request or has active refills at the pharmacy. Check previous templates. Requested Prescriptions     Refused Prescriptions Disp Refills    Tretinoin 0.05 % External Cream 20 g 2     Sig: Apply to acne  as directed at bedtime.      Refused By: Nahomy Armijo     Reason for Refusal: Patient has requested refill too soon

## 2023-08-07 DIAGNOSIS — R63.2 BINGE EATING: ICD-10-CM

## 2023-08-25 ENCOUNTER — OFFICE VISIT (OUTPATIENT)
Dept: SURGERY | Facility: CLINIC | Age: 23
End: 2023-08-25
Payer: COMMERCIAL

## 2023-08-25 VITALS
DIASTOLIC BLOOD PRESSURE: 70 MMHG | HEIGHT: 65 IN | WEIGHT: 128.63 LBS | HEART RATE: 128 BPM | BODY MASS INDEX: 21.43 KG/M2 | SYSTOLIC BLOOD PRESSURE: 124 MMHG | OXYGEN SATURATION: 100 %

## 2023-08-25 DIAGNOSIS — R63.2 BINGE EATING: Primary | ICD-10-CM

## 2023-08-25 DIAGNOSIS — Z51.81 ENCOUNTER FOR THERAPEUTIC DRUG MONITORING: ICD-10-CM

## 2023-08-25 DIAGNOSIS — R53.82 CHRONIC FATIGUE: ICD-10-CM

## 2023-08-25 DIAGNOSIS — R63.2 INCREASED APPETITE: ICD-10-CM

## 2023-08-25 DIAGNOSIS — F43.9 STRESS: ICD-10-CM

## 2023-08-25 PROCEDURE — 3008F BODY MASS INDEX DOCD: CPT | Performed by: INTERNAL MEDICINE

## 2023-08-25 PROCEDURE — 3078F DIAST BP <80 MM HG: CPT | Performed by: INTERNAL MEDICINE

## 2023-08-25 PROCEDURE — 99214 OFFICE O/P EST MOD 30 MIN: CPT | Performed by: INTERNAL MEDICINE

## 2023-08-25 PROCEDURE — 3074F SYST BP LT 130 MM HG: CPT | Performed by: INTERNAL MEDICINE

## 2023-12-06 DIAGNOSIS — R63.2 BINGE EATING: Primary | ICD-10-CM

## 2023-12-06 RX ORDER — LISDEXAMFETAMINE DIMESYLATE CAPSULES 30 MG/1
30 CAPSULE ORAL DAILY
Qty: 30 CAPSULE | Refills: 0 | Status: SHIPPED | OUTPATIENT
Start: 2024-02-06 | End: 2024-03-07

## 2023-12-06 RX ORDER — LISDEXAMFETAMINE DIMESYLATE CAPSULES 30 MG/1
30 CAPSULE ORAL DAILY
Qty: 30 CAPSULE | Refills: 0 | Status: SHIPPED | OUTPATIENT
Start: 2024-01-06 | End: 2024-02-05

## 2023-12-06 RX ORDER — LISDEXAMFETAMINE DIMESYLATE CAPSULES 30 MG/1
30 CAPSULE ORAL DAILY
Qty: 30 CAPSULE | Refills: 0 | Status: SHIPPED | OUTPATIENT
Start: 2023-12-06 | End: 2023-12-11

## 2023-12-11 DIAGNOSIS — R63.2 BINGE EATING: ICD-10-CM

## 2023-12-11 RX ORDER — LISDEXAMFETAMINE DIMESYLATE CAPSULES 30 MG/1
30 CAPSULE ORAL DAILY
Qty: 30 CAPSULE | Refills: 0 | Status: SHIPPED | OUTPATIENT
Start: 2023-12-11 | End: 2024-01-10

## 2023-12-22 RX ORDER — TRETINOIN 0.5 MG/G
CREAM TOPICAL
Qty: 20 G | Refills: 0 | Status: SHIPPED | OUTPATIENT
Start: 2023-12-22

## 2023-12-22 NOTE — TELEPHONE ENCOUNTER
One refill sent  To FD pls call pt to schedule an annual OV     Refill request is for a maintenance medication and has met the criteria specified in the Ambulatory Medication Refill Standing Order for eligibility, visits, laboratory, alerts and was sent to the requested pharmacy. Requested Prescriptions     Signed Prescriptions Disp Refills    Tretinoin 0.05 % External Cream 20 g 0     Sig: APPLY TO ACNE AS DIRECTED AT BEDTIME.      Authorizing Provider: Marylene Angus     Ordering User: Stella Brewer

## 2024-01-04 NOTE — PROGRESS NOTES
January 4, 2024    Home health orders was received via fax for Dr. Soto covering for Dr Hicks to sign.  Patient label was attached to paperwork and placed in provider's inbox to be signed.    Corin Samayoa     Well Woman Exam    HPI:  The patient is a 17yo female who presents for annual exam. No complaints. Doing well on POP. She declines STD screening. At [facility].      Reviewed medical and surgical history below   OBSTETRICS HISTORY:  OB History   G0  P file    Relationships      Social connections:        Talks on phone: Not on file        Gets together: Not on file        Attends Sabianist service: Not on file        Active member of club or organization: Not on file        Attends meetings of clubs or Package, Rfl: 0  •  VYVANSE 30 MG Oral Cap, , Disp: , Rfl:   •  triamcinolone acetonide 0.1 % External Cream, Use bid as directed, Disp: 80 g, Rfl: 0  •  Azelaic Acid (FINACEA) 15 % External Foam, Apply 1 Application topically daily. , Disp: 50 g, Rfl: 12 lesions and prolapse  Bladder:  No fullness, masses or tenderness  Vagina:  Normal appearance without lesions, no abnormal discharge  Cervix:  Normal without tenderness on motion  Uterus: normal in size, contour, position, mobility, without tenderness  Adn

## 2024-01-12 DIAGNOSIS — R63.2 BINGE EATING: ICD-10-CM

## 2024-01-12 RX ORDER — LISDEXAMFETAMINE DIMESYLATE CAPSULES 30 MG/1
30 CAPSULE ORAL DAILY
Qty: 30 CAPSULE | Refills: 0 | Status: SHIPPED | OUTPATIENT
Start: 2024-01-12 | End: 2024-02-11

## 2024-01-12 RX ORDER — LISDEXAMFETAMINE DIMESYLATE CAPSULES 30 MG/1
30 CAPSULE ORAL DAILY
Qty: 30 CAPSULE | Refills: 0 | Status: SHIPPED | OUTPATIENT
Start: 2024-02-12 | End: 2024-03-13

## 2024-02-23 ENCOUNTER — LAB ENCOUNTER (OUTPATIENT)
Dept: LAB | Facility: HOSPITAL | Age: 24
End: 2024-02-23
Attending: INTERNAL MEDICINE
Payer: COMMERCIAL

## 2024-02-23 ENCOUNTER — OFFICE VISIT (OUTPATIENT)
Dept: SURGERY | Facility: CLINIC | Age: 24
End: 2024-02-23
Payer: COMMERCIAL

## 2024-02-23 VITALS
WEIGHT: 131.88 LBS | BODY MASS INDEX: 21.97 KG/M2 | DIASTOLIC BLOOD PRESSURE: 72 MMHG | OXYGEN SATURATION: 99 % | HEART RATE: 124 BPM | SYSTOLIC BLOOD PRESSURE: 132 MMHG | HEIGHT: 65 IN

## 2024-02-23 DIAGNOSIS — R53.82 CHRONIC FATIGUE: ICD-10-CM

## 2024-02-23 DIAGNOSIS — Z51.81 ENCOUNTER FOR THERAPEUTIC DRUG MONITORING: ICD-10-CM

## 2024-02-23 DIAGNOSIS — R63.2 INCREASED APPETITE: ICD-10-CM

## 2024-02-23 DIAGNOSIS — R63.2 BINGE EATING: ICD-10-CM

## 2024-02-23 DIAGNOSIS — R63.2 BINGE EATING: Primary | ICD-10-CM

## 2024-02-23 LAB
ATRIAL RATE: 82 BPM
P AXIS: 74 DEGREES
P-R INTERVAL: 150 MS
Q-T INTERVAL: 354 MS
QRS DURATION: 82 MS
QTC CALCULATION (BEZET): 413 MS
R AXIS: 81 DEGREES
T AXIS: 42 DEGREES
VENTRICULAR RATE: 82 BPM

## 2024-02-23 PROCEDURE — 93005 ELECTROCARDIOGRAM TRACING: CPT

## 2024-02-23 PROCEDURE — 93010 ELECTROCARDIOGRAM REPORT: CPT | Performed by: INTERNAL MEDICINE

## 2024-02-23 PROCEDURE — 99214 OFFICE O/P EST MOD 30 MIN: CPT | Performed by: INTERNAL MEDICINE

## 2024-02-23 RX ORDER — LISDEXAMFETAMINE DIMESYLATE CAPSULES 30 MG/1
30 CAPSULE ORAL DAILY
Qty: 30 CAPSULE | Refills: 0 | Status: SHIPPED | OUTPATIENT
Start: 2024-03-11 | End: 2024-04-10

## 2024-02-23 NOTE — PROGRESS NOTES
[facility], [facility], [place]  [address]  [place] [place] [address]  Dept: [phone]       Patient:  Nataly Powell  :      3/19/2000  MRN:      [id number]    Chief Complaint:    Chief Complaint   Patient presents with    Follow - Up    Weight Management     Weight check        SUBJECTIVE     History of Present Illness:  Nataly is being seen today for a follow-up for non surgical weight loss.     Past Medical History:   Past Medical History:   Diagnosis Date    Acute bronchitis     Allergic rhinitis     Closed fracture of unspecified phalanx or phalanges of hand     Facial erythema 2001    Other acne 2014    Overweight (BMI 25.0-29.9)     Seasonal allergies     Skin tag     removal via cautery    Sprain of ankle, unspecified site     Rt    URI (upper respiratory infection)     Verruca 2006    Vitamin D deficiency         Comorbidities:  Back pain-Improvement?  yes and EMILIE-Improvement?  yes    OBJECTIVE     Vitals: /72   Pulse (!) 124   Ht 5' 5\" (1.651 m)   Wt 131 lb 14.4 oz (59.8 kg)   SpO2 99%   BMI 21.95 kg/m²     Initial weight loss:+03   Total weight loss: -39   Start weight: 170    Goal: 135    Wt Readings from Last 3 Encounters:   24 131 lb 14.4 oz (59.8 kg)   23 128 lb 9.6 oz (58.3 kg)   23 129 lb 4.8 oz (58.7 kg)       Patient Medications:    Current Outpatient Medications   Medication Sig Dispense Refill    lisdexamfetamine (VYVANSE) 30 MG Oral Cap Take 1 capsule (30 mg total) by mouth daily. 30 capsule 0    Tretinoin 0.05 % External Cream APPLY TO ACNE AS DIRECTED AT BEDTIME. 20 g 0    Norethindrone (ORTHO MICRONOR) 0.35 MG Oral Tab Take 1 tablet (0.35 mg total) by mouth daily. 3 each 3    clindamycin 1 % External Lotion Apply 1 Application topically daily. 60 mL 3    metroNIDAZOLE 0.75 % External Gel Apply 1 Application topically 2 (two) times daily. 45 g 1    triamcinolone 0.1 % External Ointment Apply  1 Application topically 2 (two) times daily. 80 g 1    acyclovir 400 MG Oral Tab Take 1 tablet (400 mg total) by mouth 5 (five) times daily. 30 tablet 3     Allergies:  Cats claw, uncaria tomentosa; Dog dander [dander]; and Mold     Social History:    Social History     Socioeconomic History    Marital status: Single     Spouse name: Not on file    Number of children: Not on file    Years of education: Not on file    Highest education level: Not on file   Occupational History    Not on file   Tobacco Use    Smoking status: Never    Smokeless tobacco: Never    Tobacco comments:     No household smokers.    Vaping Use    Vaping Use: Not on file   Substance and Sexual Activity    Alcohol use: Not Currently     Alcohol/week: 0.0 standard drinks of alcohol     Comment: social    Drug use: No    Sexual activity: Yes     Birth control/protection: Condom, OCP   Other Topics Concern     Service Not Asked    Blood Transfusions Not Asked    Caffeine Concern Yes     Comment: 2 cups daily coffee or tea    Occupational Exposure Not Asked    Hobby Hazards Not Asked    Sleep Concern Not Asked    Stress Concern Not Asked    Weight Concern Not Asked    Special Diet Not Asked    Back Care Not Asked    Exercise Yes     Comment: gym class    Bike Helmet Not Asked    Seat Belt Not Asked    Self-Exams Not Asked    Grew up on a farm Not Asked    History of tanning No    Outdoor occupation No    Breast feeding Not Asked    Reaction to local anesthetic No    Pt has a pacemaker No    Pt has a defibrillator No   Social History Narrative    Not on file     Social Determinants of Health     Financial Resource Strain: Not on file   Food Insecurity: Not on file   Transportation Needs: Not on file   Physical Activity: Not on file   Stress: Not on file   Social Connections: Not on file   Housing Stability: Not on file     Surgical History:    Past Surgical History:   Procedure Laterality Date    APPENDECTOMY      LAPAROSCOPIC APPENDECTOMY   09/13/2019     Family History:    Family History   Problem Relation Age of Onset    High Cholesterol Father     No Known Problems Maternal Grandmother     No Known Problems Maternal Grandfather     No Known Problems Paternal Grandmother     No Known Problems Sister        Food Journal  Reviewed and Discussed:       Patient has a Food Journal?: yes   Patient is reading nutrition labels?  yes  Average Caloric Intake:     Average CHO Intake: 100  Is patient exercising? yes  Type of exercise? Run 3/week  Core   legs    Eating Habits  Patient states the following:  Eats 3 meal(s) per day  Length of time it takes to consume a meal:  20  # of snacks per day: 1 Type of snacks:  fruit  Amount of soda consumption per day:  0  Amount of water (in ounces) per day:  64  Drinking between meals only:  yes  Toughest challenge:      Nutritional Goals  Limit carbohydrates to 100 gms per day, Eat 100-200 calories within 1 hour of waking  and Eat 3-4 cups of fresh fruits or vegetables daily    Behavior Modifications Reviewed and Discussed  Eat breakfast, Eat 3 meals per day, Plan meals in advance, Read nutrition labels, Drink 64 oz of water per day, Maintain a daily food journal, No drinking 30 minutes before or after meals, Utlize portion control strategies to reduce calorie intake, Identify triggers for eating and manage cues and Eat slowly and take 20 to 30 minutes to complete each meal    Exercise Goals Reviewed and Discussed    Walk for  45 Minutes and Treadmill for  45 Minutes    ROS:    Constitutional: negative  Respiratory: negative  Cardiovascular: negative  Gastrointestinal: negative  Musculoskeletal:negative  Neurological: negative  Behavioral/Psych: negative  All other systems were reviewed and are negative    Physical Exam:   General appearance: alert, appears stated age and cooperative  Head: Normocephalic, without obvious abnormality, atraumatic  Eyes: conjunctivae/corneas clear. PERRL, EOM's intact. Fundi  benign.  Lungs: clear to auscultation bilaterally  Heart: S1, S2 normal, no murmur, click, rub or gallop, regular rate and rhythm  Abdomen: soft, non-tender; bowel sounds normal; no masses,  no organomegaly  Extremities: extremities normal, atraumatic, no cyanosis or edema  Pulses: 2+ and symmetric  Skin: Skin color, texture, turgor normal. No rashes or lesions    ASSESSMENT       Encounter Diagnosis(ses):   Encounter Diagnoses   Name Primary?    Binge eating Yes    Encounter for therapeutic drug monitoring     Chronic fatigue     Increased appetite        PLAN   No orders of the defined types were placed in this encounter.    Patient is not interested in bariatric surgery. Patient desires to pursue traditional weight loss at this time.      Fatigue: improving with weight loss      Binge eating: improved with vyvanse  Increased stress with school    Goals for next month:  1. Keep a food log.  2. Drink 48-64 ounces of non-caloric beverages per day. No fruit juices or regular soda.  3. Increase activity-upper body exercises, walk 10 minutes per day.  4. Increase fruit and vegetable servings to 5-6 per day.      Feels much better with vyvanse  Tolerating well  ekg done    Increased cravings in the evening      No orders of the defined types were placed in this encounter.        Tesfaye Turner MD

## 2024-03-04 ENCOUNTER — TELEPHONE (OUTPATIENT)
Dept: INTERNAL MEDICINE CLINIC | Facility: CLINIC | Age: 24
End: 2024-03-04

## 2024-03-05 ENCOUNTER — PATIENT MESSAGE (OUTPATIENT)
Dept: OBGYN CLINIC | Facility: CLINIC | Age: 24
End: 2024-03-05

## 2024-03-05 RX ORDER — ACETAMINOPHEN AND CODEINE PHOSPHATE 120; 12 MG/5ML; MG/5ML
0.35 SOLUTION ORAL DAILY
Qty: 3 EACH | Refills: 3 | Status: CANCELLED | OUTPATIENT
Start: 2024-03-05

## 2024-03-05 RX ORDER — METRONIDAZOLE 7.5 MG/G
GEL TOPICAL
Qty: 45 G | Refills: 1 | Status: SHIPPED | OUTPATIENT
Start: 2024-03-05

## 2024-03-05 NOTE — TELEPHONE ENCOUNTER
Refill Request for medication(s):     Last Office Visit: 5/5/23    Last Refill: 5/5/23    Pharmacy, Dosage verified: yes    Condition Update (if applicable):     Rx pended and sent to provider for approval, please advise. Thank You!

## 2024-03-05 NOTE — TELEPHONE ENCOUNTER
From: Nataly Powell  To: Tania REID Chávez  Sent: 3/5/2024 8:59 AM CST  Subject: Birth Control Refill    Hi! I was hoping that you could refill my birth control for the highest amount that my insurance will allow. [place] and would like to have be ahead in that. I plan to schedule an annual visit in mid/late summer because I cannot make any appointment times work before I leave. Please let me know if I can answer any questions. This birth control has worked great for me for years now.

## 2024-03-06 RX ORDER — ACETAMINOPHEN AND CODEINE PHOSPHATE 120; 12 MG/5ML; MG/5ML
0.35 SOLUTION ORAL DAILY
Qty: 84 TABLET | Refills: 3 | Status: SHIPPED | OUTPATIENT
Start: 2024-03-06

## 2024-04-08 DIAGNOSIS — R63.2 BINGE EATING: Primary | ICD-10-CM

## 2024-04-08 RX ORDER — LISDEXAMFETAMINE DIMESYLATE CAPSULES 30 MG/1
30 CAPSULE ORAL DAILY
Qty: 30 CAPSULE | Refills: 0 | Status: SHIPPED | OUTPATIENT
Start: 2024-04-08 | End: 2024-05-08

## 2024-04-08 RX ORDER — LISDEXAMFETAMINE DIMESYLATE CAPSULES 30 MG/1
30 CAPSULE ORAL DAILY
Qty: 30 CAPSULE | Refills: 0 | Status: SHIPPED | OUTPATIENT
Start: 2024-06-09 | End: 2024-07-09

## 2024-04-08 RX ORDER — LISDEXAMFETAMINE DIMESYLATE CAPSULES 30 MG/1
30 CAPSULE ORAL DAILY
Qty: 30 CAPSULE | Refills: 0 | Status: SHIPPED | OUTPATIENT
Start: 2024-05-09 | End: 2024-06-08

## 2024-06-10 DIAGNOSIS — R63.2 BINGE EATING: ICD-10-CM

## 2024-06-10 RX ORDER — LISDEXAMFETAMINE DIMESYLATE 30 MG/1
30 CAPSULE ORAL DAILY
Qty: 30 CAPSULE | Refills: 0 | Status: SHIPPED | OUTPATIENT
Start: 2024-06-10 | End: 2024-07-10

## 2024-07-11 DIAGNOSIS — R63.2 BINGE EATING: Primary | ICD-10-CM

## 2024-07-11 RX ORDER — LISDEXAMFETAMINE DIMESYLATE 30 MG/1
30 CAPSULE ORAL DAILY
Qty: 30 CAPSULE | Refills: 0 | Status: SHIPPED | OUTPATIENT
Start: 2024-08-11 | End: 2024-09-10

## 2024-07-11 RX ORDER — LISDEXAMFETAMINE DIMESYLATE 30 MG/1
30 CAPSULE ORAL DAILY
Qty: 30 CAPSULE | Refills: 0 | Status: SHIPPED | OUTPATIENT
Start: 2024-07-11 | End: 2024-08-10

## 2024-07-11 RX ORDER — LISDEXAMFETAMINE DIMESYLATE 30 MG/1
30 CAPSULE ORAL DAILY
Qty: 30 CAPSULE | Refills: 0 | Status: SHIPPED | OUTPATIENT
Start: 2024-09-11 | End: 2024-10-11

## 2024-08-16 ENCOUNTER — OFFICE VISIT (OUTPATIENT)
Dept: SURGERY | Facility: CLINIC | Age: 24
End: 2024-08-16
Payer: COMMERCIAL

## 2024-08-16 VITALS
DIASTOLIC BLOOD PRESSURE: 72 MMHG | WEIGHT: 132.31 LBS | HEART RATE: 96 BPM | HEIGHT: 65 IN | BODY MASS INDEX: 22.04 KG/M2 | SYSTOLIC BLOOD PRESSURE: 112 MMHG | OXYGEN SATURATION: 99 %

## 2024-08-16 DIAGNOSIS — R63.2 BINGE EATING: Primary | ICD-10-CM

## 2024-08-16 DIAGNOSIS — Z51.81 ENCOUNTER FOR THERAPEUTIC DRUG MONITORING: ICD-10-CM

## 2024-08-16 DIAGNOSIS — F43.9 STRESS: ICD-10-CM

## 2024-08-16 DIAGNOSIS — R63.2 BINGE EATING: ICD-10-CM

## 2024-08-16 PROCEDURE — 99212 OFFICE O/P EST SF 10 MIN: CPT | Performed by: INTERNAL MEDICINE

## 2024-08-16 RX ORDER — LISDEXAMFETAMINE DIMESYLATE 30 MG/1
30 CAPSULE ORAL DAILY
Qty: 30 CAPSULE | Refills: 0 | Status: SHIPPED | OUTPATIENT
Start: 2024-08-16 | End: 2024-08-16

## 2024-08-16 RX ORDER — LISDEXAMFETAMINE DIMESYLATE 30 MG/1
30 CAPSULE ORAL DAILY
Qty: 30 CAPSULE | Refills: 0 | Status: SHIPPED | OUTPATIENT
Start: 2024-08-16 | End: 2024-09-15

## 2024-08-16 NOTE — PROGRESS NOTES
[facility], [facility], [place]  [address]  [place] [address]  Dept: [phone]       Patient:  Nataly Powell  :      3/19/2000  MRN:      [id number]    Chief Complaint:    Chief Complaint   Patient presents with    Follow - Up    Weight Management       SUBJECTIVE     History of Present Illness:  Nataly is being seen today for a follow-up for non surgical weight loss.     Past Medical History:   Past Medical History:    Acute bronchitis    Allergic rhinitis    Closed fracture of unspecified phalanx or phalanges of hand    Facial erythema    Other acne    Overweight (BMI 25.0-29.9)    Seasonal allergies    Skin tag    removal via cautery    Sprain of ankle, unspecified site    Rt    URI (upper respiratory infection)    Verruca    Vitamin D deficiency        Comorbidities:  Back pain-Improvement?  yes and EMILIE-Improvement?  yes    OBJECTIVE     Vitals: /72   Pulse 96   Ht 5' 5\" (1.651 m)   Wt 132 lb 4.8 oz (60 kg)   SpO2 99%   BMI 22.02 kg/m²     Initial weight loss:+01   Total weight loss: -38   Start weight: 170    Goal: 135    Wt Readings from Last 3 Encounters:   24 132 lb 4.8 oz (60 kg)   24 131 lb 14.4 oz (59.8 kg)   23 128 lb 9.6 oz (58.3 kg)       Patient Medications:    Current Outpatient Medications   Medication Sig Dispense Refill    lisdexamfetamine (VYVANSE) 30 MG Oral Cap Take 1 capsule (30 mg total) by mouth daily. 30 capsule 0    [START ON 2024] lisdexamfetamine (VYVANSE) 30 MG Oral Cap Take 1 capsule (30 mg total) by mouth daily. 30 capsule 0    NORETHINDRONE 0.35 MG Oral Tab TAKE 1 TABLET BY MOUTH EVERY DAY 84 tablet 3    Tretinoin 0.05 % External Cream APPLY TO ACNE AS DIRECTED AT BEDTIME. 20 g 0    acyclovir 400 MG Oral Tab Take 1 tablet (400 mg total) by mouth 5 (five) times daily. 30 tablet 3     Allergies:  Cats claw, uncaria tomentosa; Dog dander [dander]; and Mold     Social History:     Social History     Socioeconomic History    Marital status: Single     Spouse name: Not on file    Number of children: Not on file    Years of education: Not on file    Highest education level: Not on file   Occupational History    Not on file   Tobacco Use    Smoking status: Never    Smokeless tobacco: Never    Tobacco comments:     No household smokers.    Vaping Use    Vaping status: Not on file   Substance and Sexual Activity    Alcohol use: Not Currently     Alcohol/week: 0.0 standard drinks of alcohol     Comment: social    Drug use: No    Sexual activity: Yes     Birth control/protection: Condom, OCP   Other Topics Concern     Service Not Asked    Blood Transfusions Not Asked    Caffeine Concern Yes     Comment: 2 cups daily coffee or tea    Occupational Exposure Not Asked    Hobby Hazards Not Asked    Sleep Concern Not Asked    Stress Concern Not Asked    Weight Concern Not Asked    Special Diet Not Asked    Back Care Not Asked    Exercise Yes     Comment: gym class    Bike Helmet Not Asked    Seat Belt Not Asked    Self-Exams Not Asked    Grew up on a farm Not Asked    History of tanning No    Outdoor occupation No    Breast feeding Not Asked    Reaction to local anesthetic No    Pt has a pacemaker No    Pt has a defibrillator No   Social History Narrative    Not on file     Social Determinants of Health     Financial Resource Strain: Not on file   Food Insecurity: Not on file   Transportation Needs: Not on file   Physical Activity: Not on file   Stress: Not on file   Social Connections: Not on file   Housing Stability: Not on file     Surgical History:    Past Surgical History:   Procedure Laterality Date    Appendectomy      Laparoscopic appendectomy  09/13/2019     Family History:    Family History   Problem Relation Age of Onset    High Cholesterol Father     No Known Problems Maternal Grandmother     No Known Problems Maternal Grandfather     No Known Problems Paternal Grandmother     No  Known Problems Sister        Food Journal  Reviewed and Discussed:       Patient has a Food Journal?: yes   Patient is reading nutrition labels?  yes  Average Caloric Intake:     Average CHO Intake: 100  Is patient exercising? yes  Type of exercise? Run 3/week  Core   legs    Eating Habits  Patient states the following:  Eats 3 meal(s) per day  Length of time it takes to consume a meal:  20  # of snacks per day: 1 Type of snacks:  fruit  Amount of soda consumption per day:  0  Amount of water (in ounces) per day:  64  Drinking between meals only:  yes  Toughest challenge:      Nutritional Goals  Limit carbohydrates to 100 gms per day, Eat 100-200 calories within 1 hour of waking  and Eat 3-4 cups of fresh fruits or vegetables daily    Behavior Modifications Reviewed and Discussed  Eat breakfast, Eat 3 meals per day, Plan meals in advance, Read nutrition labels, Drink 64 oz of water per day, Maintain a daily food journal, No drinking 30 minutes before or after meals, Utlize portion control strategies to reduce calorie intake, Identify triggers for eating and manage cues and Eat slowly and take 20 to 30 minutes to complete each meal    Exercise Goals Reviewed and Discussed    Walk for  45 Minutes and Treadmill for  45 Minutes    ROS:    Constitutional: negative  Respiratory: negative  Cardiovascular: negative  Gastrointestinal: negative  Musculoskeletal:negative  Neurological: negative  Behavioral/Psych: negative  All other systems were reviewed and are negative    Physical Exam:   General appearance: alert, appears stated age and cooperative  Head: Normocephalic, without obvious abnormality, atraumatic  Eyes: conjunctivae/corneas clear. PERRL, EOM's intact. Fundi benign.  Lungs: clear to auscultation bilaterally  Heart: S1, S2 normal, no murmur, click, rub or gallop, regular rate and rhythm  Abdomen: soft, non-tender; bowel sounds normal; no masses,  no organomegaly  Extremities: extremities normal, atraumatic, no  cyanosis or edema  Pulses: 2+ and symmetric  Skin: Skin color, texture, turgor normal. No rashes or lesions    ASSESSMENT       Encounter Diagnosis(ses):   Encounter Diagnoses   Name Primary?    Binge eating Yes    Encounter for therapeutic drug monitoring     Stress        PLAN   No orders of the defined types were placed in this encounter.    Patient is not interested in bariatric surgery. Patient desires to pursue traditional weight loss at this time.      Fatigue: improving with weight loss      Binge eating: improved with vyvanse  Increased stress with school    Goals for next month:  1. Keep a food log.  2. Drink 48-64 ounces of non-caloric beverages per day. No fruit juices or regular soda.  3. Increase activity-upper body exercises, walk 10 minutes per day.  4. Increase fruit and vegetable servings to 5-6 per day.      Feels much better with vyvanse  Tolerating well  ekg done    Increased cravings in the evening      No orders of the defined types were placed in this encounter.        Tesfaye Turner MD

## 2024-09-16 DIAGNOSIS — R63.2 BINGE EATING: Primary | ICD-10-CM

## 2024-09-16 RX ORDER — LISDEXAMFETAMINE DIMESYLATE 30 MG/1
30 CAPSULE ORAL DAILY
Qty: 30 CAPSULE | Refills: 0 | Status: SHIPPED | OUTPATIENT
Start: 2024-11-17 | End: 2024-12-17

## 2024-09-16 RX ORDER — LISDEXAMFETAMINE DIMESYLATE 30 MG/1
30 CAPSULE ORAL DAILY
Qty: 30 CAPSULE | Refills: 0 | Status: SHIPPED | OUTPATIENT
Start: 2024-10-17 | End: 2024-11-16

## 2024-09-16 RX ORDER — LISDEXAMFETAMINE DIMESYLATE 30 MG/1
30 CAPSULE ORAL DAILY
Qty: 30 CAPSULE | Refills: 0 | Status: SHIPPED | OUTPATIENT
Start: 2024-09-16 | End: 2024-10-16

## 2024-12-16 DIAGNOSIS — R63.2 BINGE EATING: Primary | ICD-10-CM

## 2024-12-16 RX ORDER — LISDEXAMFETAMINE DIMESYLATE 30 MG/1
30 CAPSULE ORAL DAILY
Qty: 30 CAPSULE | Refills: 0 | Status: SHIPPED | OUTPATIENT
Start: 2025-02-16 | End: 2025-03-18

## 2024-12-16 RX ORDER — LISDEXAMFETAMINE DIMESYLATE 30 MG/1
30 CAPSULE ORAL DAILY
Qty: 30 CAPSULE | Refills: 0 | Status: SHIPPED | OUTPATIENT
Start: 2025-01-16 | End: 2025-02-15

## 2024-12-16 RX ORDER — LISDEXAMFETAMINE DIMESYLATE 30 MG/1
30 CAPSULE ORAL DAILY
Qty: 30 CAPSULE | Refills: 0 | Status: SHIPPED | OUTPATIENT
Start: 2024-12-16 | End: 2025-01-15

## 2025-02-07 ENCOUNTER — OFFICE VISIT (OUTPATIENT)
Dept: SURGERY | Facility: CLINIC | Age: 25
End: 2025-02-07

## 2025-02-07 VITALS
WEIGHT: 129.88 LBS | HEIGHT: 65 IN | SYSTOLIC BLOOD PRESSURE: 110 MMHG | OXYGEN SATURATION: 97 % | DIASTOLIC BLOOD PRESSURE: 80 MMHG | HEART RATE: 120 BPM | BODY MASS INDEX: 21.64 KG/M2

## 2025-02-07 DIAGNOSIS — F43.9 STRESS: ICD-10-CM

## 2025-02-07 DIAGNOSIS — Z51.81 ENCOUNTER FOR THERAPEUTIC DRUG MONITORING: ICD-10-CM

## 2025-02-07 DIAGNOSIS — R63.2 BINGE EATING: Primary | ICD-10-CM

## 2025-02-07 PROCEDURE — 99212 OFFICE O/P EST SF 10 MIN: CPT | Performed by: INTERNAL MEDICINE

## 2025-02-07 NOTE — PROGRESS NOTES
[facility], [facility], [place]  [address]  [place] [address]  Dept: [phone]       Patient:  Nataly Powell  :      3/19/2000  MRN:      [id number]    Chief Complaint:    Chief Complaint   Patient presents with    Follow - Up    Weight Management       SUBJECTIVE     History of Present Illness:  Nataly is being seen today for a follow-up for non surgical weight loss.     Past Medical History:   Past Medical History:    Acute bronchitis    Allergic rhinitis    Closed fracture of unspecified phalanx or phalanges of hand    Facial erythema    Other acne    Overweight (BMI 25.0-29.9)    Seasonal allergies    Skin tag    removal via cautery    Sprain of ankle, unspecified site    Rt    URI (upper respiratory infection)    Verruca    Vitamin D deficiency        Comorbidities:  Back pain-Improvement?  yes and EMILIE-Improvement?  yes    OBJECTIVE     Vitals: /80 (BP Location: Left arm, Patient Position: Sitting, Cuff Size: adult)   Pulse 120   Ht 5' 5\" (1.651 m)   Wt 129 lb 14.4 oz (58.9 kg)   SpO2 97%   BMI 21.62 kg/m²     Initial weight loss:-03   Total weight loss: -40   Start weight: 170    Goal: 135    Wt Readings from Last 3 Encounters:   25 129 lb 14.4 oz (58.9 kg)   24 132 lb 4.8 oz (60 kg)   24 131 lb 14.4 oz (59.8 kg)       Patient Medications:    Current Outpatient Medications   Medication Sig Dispense Refill    lisdexamfetamine (VYVANSE) 30 MG Oral Cap Take 1 capsule (30 mg total) by mouth daily. 30 capsule 0    [START ON 2025] lisdexamfetamine (VYVANSE) 30 MG Oral Cap Take 1 capsule (30 mg total) by mouth daily. 30 capsule 0    NORETHINDRONE 0.35 MG Oral Tab TAKE 1 TABLET BY MOUTH EVERY DAY 84 tablet 3    Tretinoin 0.05 % External Cream APPLY TO ACNE AS DIRECTED AT BEDTIME. 20 g 0    acyclovir 400 MG Oral Tab Take 1 tablet (400 mg total) by mouth 5 (five) times daily. 30 tablet 3     Allergies:  Cats claw,  uncaria tomentosa; Dog dander [dander]; and Mold     Social History:    Social History     Socioeconomic History    Marital status: Single     Spouse name: Not on file    Number of children: Not on file    Years of education: Not on file    Highest education level: Not on file   Occupational History    Not on file   Tobacco Use    Smoking status: Never    Smokeless tobacco: Never    Tobacco comments:     No household smokers.    Vaping Use    Vaping status: Not on file   Substance and Sexual Activity    Alcohol use: Not Currently     Alcohol/week: 0.0 standard drinks of alcohol     Comment: social    Drug use: No    Sexual activity: Yes     Birth control/protection: Condom, OCP   Other Topics Concern     Service Not Asked    Blood Transfusions Not Asked    Caffeine Concern Yes     Comment: 2 cups daily coffee or tea    Occupational Exposure Not Asked    Hobby Hazards Not Asked    Sleep Concern Not Asked    Stress Concern Not Asked    Weight Concern Not Asked    Special Diet Not Asked    Back Care Not Asked    Exercise Yes     Comment: gym class    Bike Helmet Not Asked    Seat Belt Not Asked    Self-Exams Not Asked    Grew up on a farm Not Asked    History of tanning No    Outdoor occupation No    Breast feeding Not Asked    Reaction to local anesthetic No    Pt has a pacemaker No    Pt has a defibrillator No   Social History Narrative    Not on file     Social Drivers of Health     Food Insecurity: Not on file   Transportation Needs: Not on file   Stress: Not on file   Housing Stability: Not on file     Surgical History:    Past Surgical History:   Procedure Laterality Date    Appendectomy      Laparoscopic appendectomy  09/13/2019     Family History:    Family History   Problem Relation Age of Onset    High Cholesterol Father     No Known Problems Maternal Grandmother     No Known Problems Maternal Grandfather     No Known Problems Paternal Grandmother     No Known Problems Sister        Food  Journal  Reviewed and Discussed:       Patient has a Food Journal?: yes   Patient is reading nutrition labels?  yes  Average Caloric Intake:     Average CHO Intake: 100  Is patient exercising? yes  Type of exercise? Run 3/week  Core   legs    Eating Habits  Patient states the following:  Eats 3 meal(s) per day  Length of time it takes to consume a meal:  20  # of snacks per day: 1 Type of snacks:  fruit  Amount of soda consumption per day:  0  Amount of water (in ounces) per day:  64  Drinking between meals only:  yes  Toughest challenge:      Nutritional Goals  Limit carbohydrates to 100 gms per day, Eat 100-200 calories within 1 hour of waking  and Eat 3-4 cups of fresh fruits or vegetables daily    Behavior Modifications Reviewed and Discussed  Eat breakfast, Eat 3 meals per day, Plan meals in advance, Read nutrition labels, Drink 64 oz of water per day, Maintain a daily food journal, No drinking 30 minutes before or after meals, Utlize portion control strategies to reduce calorie intake, Identify triggers for eating and manage cues and Eat slowly and take 20 to 30 minutes to complete each meal    Exercise Goals Reviewed and Discussed    Walk for  45 Minutes and Treadmill for  45 Minutes    ROS:    Constitutional: negative  Respiratory: negative  Cardiovascular: negative  Gastrointestinal: negative  Musculoskeletal:negative  Neurological: negative  Behavioral/Psych: negative  All other systems were reviewed and are negative    Physical Exam:   General appearance: alert, appears stated age and cooperative  Head: Normocephalic, without obvious abnormality, atraumatic  Eyes: conjunctivae/corneas clear. PERRL, EOM's intact. Fundi benign.  Lungs: clear to auscultation bilaterally  Heart: S1, S2 normal, no murmur, click, rub or gallop, regular rate and rhythm  Abdomen: soft, non-tender; bowel sounds normal; no masses,  no organomegaly  Extremities: extremities normal, atraumatic, no cyanosis or edema  Pulses: 2+ and  symmetric  Skin: Skin color, texture, turgor normal. No rashes or lesions    ASSESSMENT       Encounter Diagnosis(ses):   Encounter Diagnoses   Name Primary?    Binge eating Yes    Encounter for therapeutic drug monitoring     Stress        PLAN   No orders of the defined types were placed in this encounter.    Patient is not interested in bariatric surgery. Patient desires to pursue traditional weight loss at this time.      Fatigue: improving with weight loss      Binge eating: improved with vyvanse  Increased stress with school    Goals for next month:  1. Keep a food log.  2. Drink 48-64 ounces of non-caloric beverages per day. No fruit juices or regular soda.  3. Increase activity-upper body exercises, walk 10 minutes per day.  4. Increase fruit and vegetable servings to 5-6 per day.      Feels much better with vyvanse  Tolerating well  ekg done  Increased hunger noted. May benefit from increasing dose      No orders of the defined types were placed in this encounter.        Tesfaye Turner MD

## 2025-03-17 DIAGNOSIS — R63.2 BINGE EATING: Primary | ICD-10-CM

## 2025-03-17 RX ORDER — LISDEXAMFETAMINE DIMESYLATE 30 MG/1
30 CAPSULE ORAL DAILY
Qty: 30 CAPSULE | Refills: 0 | Status: SHIPPED | OUTPATIENT
Start: 2025-03-17 | End: 2025-04-16

## 2025-03-17 RX ORDER — LISDEXAMFETAMINE DIMESYLATE 30 MG/1
30 CAPSULE ORAL DAILY
Qty: 30 CAPSULE | Refills: 0 | Status: SHIPPED | OUTPATIENT
Start: 2025-04-17 | End: 2025-05-17

## 2025-03-17 RX ORDER — LISDEXAMFETAMINE DIMESYLATE 30 MG/1
30 CAPSULE ORAL DAILY
Qty: 30 CAPSULE | Refills: 0 | Status: SHIPPED | OUTPATIENT
Start: 2025-05-18 | End: 2025-06-17

## (undated) DEVICE — UNDYED BRAIDED (POLYGLACTIN 910), SYNTHETIC ABSORBABLE SUTURE: Brand: COATED VICRYL

## (undated) DEVICE — LAP CHOLE: Brand: MEDLINE INDUSTRIES, INC.

## (undated) DEVICE — ENCORE® LATEX ACCLAIM SIZE 8, STERILE LATEX POWDER-FREE SURGICAL GLOVE: Brand: ENCORE

## (undated) DEVICE — ETS45 RELOAD STANDARD 45MM: Brand: ENDOPATH

## (undated) DEVICE — [HIGH FLOW INSUFFLATOR,  DO NOT USE IF PACKAGE IS DAMAGED,  KEEP DRY,  KEEP AWAY FROM SUNLIGHT,  PROTECT FROM HEAT AND RADIOACTIVE SOURCES.]: Brand: PNEUMOSURE

## (undated) DEVICE — SOL LACT RINGERS 1000ML

## (undated) DEVICE — SOL  .9 1000ML BTL

## (undated) DEVICE — TISSUE RETRIEVAL SYSTEM: Brand: INZII RETRIEVAL SYSTEM

## (undated) DEVICE — INSUFFLATION NEEDLE TO ESTABLISH PNEUMOPERITONEUM.: Brand: INSUFFLATION NEEDLE

## (undated) DEVICE — TROCAR: Brand: KII FIOS FIRST ENTRY

## (undated) DEVICE — SUTURE VICRYL 0 J906G

## (undated) DEVICE — ENDOPATH ETS-FLEX45 ARTICULATING ENDOSCOPIC LINEAR CUTTER, NO RELOAD: Brand: ENDOPATH

## (undated) DEVICE — ENDOPATH ETS45 2.5MM RELOADS (VASCULAR/THIN): Brand: ENDOPATH

## (undated) DEVICE — TROCAR: Brand: KII® SLEEVE

## (undated) NOTE — LETTER
No referring provider defined for this encounter. 09/27/19        Patient: Amada Hardwick   YOB: 2000   Date of Visit: 9/27/2019       Dear  Dr. Gil Herrera MD,      Thank you for referring Amada Hardwick to my practice.   Please f

## (undated) NOTE — MR AVS SNAPSHOT
[address]  [phone]               Thank you for choosing us for your health care visit with Calderon Escalante.  Marilu Greco MD.  We are glad to serve you and happy to provide you with this summar office. At that time, you will be provided with any authorization numbers or be assured that none are required. You can then schedule your appointment. Failure to obtain required authorization numbers can create reimbursement difficulties for you.         R RETIN-A MICRO PUMP 0.08 % Gel   Generic drug:  Tretinoin Microsphere   Apply 1 Application topically daily. Apply 1 pump daily           Tretinoin 0.05 % Crea   Apply 1 Application topically nightly.  Apply to the entire face, chest and back as directed at

## (undated) NOTE — Clinical Note
I saw Ambar Lind in the DEPARTMENT [facility] today for Viral uri  (primary encounter diagnosis). she was treated with comfort care. Ambar Diogo will follow up with you if no better or as needed.  Thank you for the opportunity to care for Ambar Lind toda

## (undated) NOTE — LETTER
2/14/2019              [address]        [address] [place], [place] [id number]       To whom it may concern,    Belen Tirado is under my care for underlying environmental allergies.   The most important measure for treating underlying environment

## (undated) NOTE — MR AVS SNAPSHOT
[facility]  [address]  [phone]               Thank you for choosing us for your health care visit with Eugenio Cutler MD.  We are glad to serve you and happy to provide you with this summa Commonly known as:  DRISDOL           Levocetirizine Dihydrochloride 5 MG Tabs   Take 1 tablet (5 mg total) by mouth nightly.    Commonly known as:  XYZAL           Lisdexamfetamine Dimesylate 30 MG Caps   Take 1 capsule (30 mg total) by mouth every morning

## (undated) NOTE — MR AVS SNAPSHOT
[facility] [address]                Thank you for choosing us for your health care visit with Stacey Nova MD.  We are glad to serve you and happy to provide you with this summary of your v clotrimazole-betamethasone 1-0.05 % Crea   Topically bid   Commonly known as:  LOTRISONE           ergocalciferol 95216 units Caps   Take 1 capsule (50,000 Units total) by mouth once a week.    Commonly known as:  DRISDOL           Levocetirizine Dihydroch walking, light jogging, cycling, swimming, etc.) for a goal of at least 150 minutes per week. Moderation of alcohol consumption Men: limit to <= 2 drinks* per day. Women and lighter weight persons: limit to <= 1 drink* per day.

## (undated) NOTE — MR AVS SNAPSHOT
[facility] [place] [address] [phone]               Thank you for choosing us for your health care visit with Debo Trujillo MD.  We are glad to serve you and happy to provide you with this summary of your v Commonly known as:  DRISDOL           Levocetirizine Dihydrochloride 5 MG Tabs   Take 1 tablet (5 mg total) by mouth nightly.    Commonly known as:  XYZAL           Lisdexamfetamine Dimesylate 30 MG Caps   Take 1 capsule (30 mg total) by mouth every morning

## (undated) NOTE — MR AVS SNAPSHOT
[facility] [address]                Thank you for choosing us for your health care visit with Shirley Scales MD.  We are glad to serve you and happy to provide you with this summary of your v Cats Claw, Uncaria Tomentosa     Dog Dander [Dander]     Mold                 Today's Vital Signs     BP Pulse    127/86 mmHg (94 %, Z = 1.53 / 97 %, Z = 1.91*) 76    Height Weight    5' 5\" (1.651 m) (61 %*, Z = 0.29) 170 lb 3 oz (92 %*, Z = 1.44)    BMI Apply 1 Application topically nightly. Apply to the entire face, chest and back as directed at bedtime.                 Where to Get Your Medications      These medications were sent to [facility] , [place] - [address]

## (undated) NOTE — Clinical Note
3/6/2017    Patient: Toan Hernandez  : 3/19/2000 Visit date: 3/6/2017    Dear  Dr. Leopold Bunk, MD,    Belen Tirado is a pleasant but unfortunate 23year old,, female, who was referred to us for weight management recommendations.   Belen Tirado is inte

## (undated) NOTE — MR AVS SNAPSHOT
[facility] SPECIALTY [facility] - [place] [address] [id number] [phone]               Thank you for choosing us for your health care visit with Leon Gee MD.  We are glad to serve you and happy to provide you with this summary o BP percentiles are based on 2000 NHANES data         Current Medications          This list is accurate as of: 2/1/17 11:02 AM.  Always use your most recent med list.                adapalene 0.1 % Crea   Apply 1 Application topically nightly.    Commonly

## (undated) NOTE — MR AVS SNAPSHOT
[address]  [phone]               Thank you for choosing us for your health care visit with Meryle Ok, MD.  We are glad to serve you and happy to provide you with this summary of your visit. Current Medications          This list is accurate as of: 3/14/17  6:45 PM.  Always use your most recent med list.                adapalene 0.1 % Crea   Apply 1 Application topically nightly.    Commonly known as:  DIFFERIN           Clindamycin Phosp

## (undated) NOTE — MR AVS SNAPSHOT
[facility] [address]                Thank you for choosing us for your health care visit with Millicent Salcedo MD.  We are glad to serve you and happy to provide you with this summary of your v * Lisdexamfetamine Dimesylate 30 MG Caps   Take 1 capsule (30 mg total) by mouth every morning. What changed:  Another medication with the same name was added. Make sure you understand how and when to take each.    Commonly known as:  VYVANSE           * office, you can view your past visit information in [place] by going to Visits < Visit Summaries. [place] questions? Call [phone] for help. [place] is NOT to be used for urgent needs. For medical emergencies, dial 911.            Visit [facility]

## (undated) NOTE — MR AVS SNAPSHOT
[facility] SPECIALTY [facility] - [place] [address] [id number] [phone]               Thank you for choosing us for your health care visit with Ember Morales MD.  We are glad to serve you and happy to provide you with this summary o adapalene 0.1 % Crea   Apply 1 Application topically nightly.    Commonly known as:  DIFFERIN           Clindamycin Phosphate 1 % Lotn   APPLY 2 TIMES EVERY DAY A THIN FILM TO THE AFFECTED AREA(S)   Commonly known as:  CLEOCIN T           clotrimazol

## (undated) NOTE — Clinical Note
Thank you for the consult. I saw  Ms. Rashad Russell in the endocrine/diabetes clinic today. Please see attached my note. Please feel free to contact me with any questions. Thanks!

## (undated) NOTE — LETTER
9/27/2019          To Whom It May Concern:    Florence Hays  Was treated for acute appendicitis on Sept. 13. She has been under my care until today. If you ou require additional information please contact our office.         Sincerely,        Mason Diaz

## (undated) NOTE — Clinical Note
Thank you for the consult. I saw MsGuille Davi Kimbroughn in the endocrine/diabetes clinic today. Please see attached my note. Please feel free to contact me with any questions. Thanks!

## (undated) NOTE — MR AVS SNAPSHOT
[facility]  [address]  [phone]               Thank you for choosing us for your health care visit with Sherine Hendricks.   We are glad to serve you and happy to provide you with this summary RETIN-A MICRO PUMP 0.08 % Gel   Generic drug:  Tretinoin Microsphere   Apply 1 Application topically daily. Apply 1 pump daily           Tretinoin 0.05 % Crea   Apply 1 Application topically nightly.  Apply to the entire face, chest and back as directed

## (undated) NOTE — LETTER
2/18/2019              [address] [id number]         To whom it may concern:     Kristina Edmond is a patient under my medical care.  It is my recommendation that she have a private or semi-private bathroom